# Patient Record
Sex: MALE | Race: WHITE | URBAN - METROPOLITAN AREA
[De-identification: names, ages, dates, MRNs, and addresses within clinical notes are randomized per-mention and may not be internally consistent; named-entity substitution may affect disease eponyms.]

---

## 2019-07-23 ENCOUNTER — ASC (OUTPATIENT)
Dept: URBAN - METROPOLITAN AREA SURGERY 8 | Facility: SURGERY | Age: 59
Setting detail: OPHTHALMOLOGY
End: 2019-07-23
Payer: COMMERCIAL

## 2019-07-23 DIAGNOSIS — E11.3312: ICD-10-CM

## 2019-07-23 PROCEDURE — 67210 TREATMENT OF RETINAL LESION: CPT | Performed by: OPHTHALMOLOGY

## 2019-07-23 ASSESSMENT — REFRACTION_CURRENTRX
OS_OVR_VA: 20/
OD_ADD: +2.00
OD_SPHERE: -0.75
OS_CYLINDER: 0.00
OS_OVR_VA: 20/
OD_OVR_VA: 20/
OS_SPHERE: -0.75
OD_CYLINDER: 0.00
OD_OVR_VA: 20/
OD_AXIS: 180
OS_ADD: +2.00
OD_VPRISM_DIRECTION: PROGS
OS_AXIS: 180
OS_VPRISM_DIRECTION: PROGS
OS_OVR_VA: 20/
OD_OVR_VA: 20/

## 2019-07-23 ASSESSMENT — REFRACTION_AUTOREFRACTION
OS_AXIS: 176
OD_SPHERE: -0.25
OS_CYLINDER: -0.25
OD_AXIS: 135
OD_CYLINDER: -0.50
OS_SPHERE: -0.75

## 2019-07-23 ASSESSMENT — REFRACTION_MANIFEST
OS_VA3: 20/
OD_VA3: 20/
OS_VA1: 20/
OS_VA2: 20/
OD_VA1: 20/
OS_VA2: 20/
OD_VA1: 20/
OD_VA2: 20/
OS_VA1: 20/
OU_VA: 20/
OD_VA3: 20/
OD_VA2: 20/
OS_VA3: 20/
OU_VA: 20/

## 2019-07-23 ASSESSMENT — VISUAL ACUITY
OS_BCVA: 20/20
OD_BCVA: 20/40

## 2019-07-23 ASSESSMENT — SPHEQUIV_DERIVED
OS_SPHEQUIV: -0.875
OD_SPHEQUIV: -0.5

## 2019-07-23 ASSESSMENT — CONFRONTATIONAL VISUAL FIELD TEST (CVF)
OS_FINDINGS: FULL
OD_FINDINGS: FULL

## 2019-08-05 ENCOUNTER — OFFICE (OUTPATIENT)
Dept: URBAN - METROPOLITAN AREA CLINIC 105 | Facility: CLINIC | Age: 59
Setting detail: OPHTHALMOLOGY
End: 2019-08-05
Payer: COMMERCIAL

## 2019-08-05 DIAGNOSIS — E11.3311: ICD-10-CM

## 2019-08-05 DIAGNOSIS — E11.3313: ICD-10-CM

## 2019-08-05 PROCEDURE — 67028 INJECTION EYE DRUG: CPT | Performed by: OPHTHALMOLOGY

## 2019-08-05 PROCEDURE — 92250 FUNDUS PHOTOGRAPHY W/I&R: CPT | Performed by: OPHTHALMOLOGY

## 2019-08-05 ASSESSMENT — REFRACTION_MANIFEST
OD_VA1: 20/
OD_VA3: 20/
OD_VA1: 20/
OS_VA2: 20/
OS_VA3: 20/
OU_VA: 20/
OS_VA3: 20/
OU_VA: 20/
OD_VA2: 20/
OS_VA2: 20/
OD_VA2: 20/
OS_VA1: 20/
OS_VA1: 20/
OD_VA3: 20/

## 2019-08-05 ASSESSMENT — REFRACTION_AUTOREFRACTION
OD_SPHERE: -0.25
OD_AXIS: 135
OD_CYLINDER: -0.50
OS_CYLINDER: -0.25
OS_SPHERE: -0.75
OS_AXIS: 176

## 2019-08-05 ASSESSMENT — REFRACTION_CURRENTRX
OS_CYLINDER: 0.00
OD_SPHERE: -0.75
OS_OVR_VA: 20/
OD_OVR_VA: 20/
OD_OVR_VA: 20/
OS_ADD: +2.00
OS_SPHERE: -0.75
OS_AXIS: 180
OD_AXIS: 180
OS_VPRISM_DIRECTION: PROGS
OD_CYLINDER: 0.00
OS_OVR_VA: 20/
OD_OVR_VA: 20/
OS_OVR_VA: 20/
OD_ADD: +2.00
OD_VPRISM_DIRECTION: PROGS

## 2019-08-05 ASSESSMENT — CONFRONTATIONAL VISUAL FIELD TEST (CVF)
OD_FINDINGS: FULL
OS_FINDINGS: FULL

## 2019-08-05 ASSESSMENT — SPHEQUIV_DERIVED
OD_SPHEQUIV: -0.5
OS_SPHEQUIV: -0.875

## 2019-08-05 ASSESSMENT — VISUAL ACUITY
OS_BCVA: 20/30
OD_BCVA: 20/80

## 2019-08-12 ENCOUNTER — OFFICE (OUTPATIENT)
Dept: URBAN - METROPOLITAN AREA CLINIC 105 | Facility: CLINIC | Age: 59
Setting detail: OPHTHALMOLOGY
End: 2019-08-12
Payer: COMMERCIAL

## 2019-08-12 DIAGNOSIS — E11.3312: ICD-10-CM

## 2019-08-12 DIAGNOSIS — E11.3313: ICD-10-CM

## 2019-08-12 PROBLEM — H11.32 SUBCONJUNCTIVAL HEMORRHAGE; LEFT EYE: Status: RESOLVED | Noted: 2019-05-22 | Resolved: 2019-08-12

## 2019-08-12 PROCEDURE — 92250 FUNDUS PHOTOGRAPHY W/I&R: CPT | Performed by: OPHTHALMOLOGY

## 2019-08-12 PROCEDURE — 67028 INJECTION EYE DRUG: CPT | Performed by: OPHTHALMOLOGY

## 2019-08-12 ASSESSMENT — REFRACTION_AUTOREFRACTION
OD_CYLINDER: -0.50
OS_SPHERE: -0.75
OS_AXIS: 176
OD_AXIS: 135
OD_SPHERE: -0.25
OS_CYLINDER: -0.25

## 2019-08-12 ASSESSMENT — REFRACTION_MANIFEST
OD_VA2: 20/
OS_VA2: 20/
OD_VA1: 20/
OS_VA3: 20/
OD_VA3: 20/
OS_VA1: 20/
OS_VA2: 20/
OD_VA3: 20/
OU_VA: 20/
OD_VA1: 20/
OD_VA2: 20/
OU_VA: 20/
OS_VA1: 20/
OS_VA3: 20/

## 2019-08-12 ASSESSMENT — REFRACTION_CURRENTRX
OD_OVR_VA: 20/
OS_OVR_VA: 20/
OS_OVR_VA: 20/
OS_ADD: +2.00
OS_AXIS: 180
OD_CYLINDER: 0.00
OS_OVR_VA: 20/
OD_ADD: +2.00
OD_SPHERE: -0.75
OD_OVR_VA: 20/
OS_SPHERE: -0.75
OS_VPRISM_DIRECTION: PROGS
OS_CYLINDER: 0.00
OD_VPRISM_DIRECTION: PROGS
OD_AXIS: 180
OD_OVR_VA: 20/

## 2019-08-12 ASSESSMENT — SPHEQUIV_DERIVED
OD_SPHEQUIV: -0.5
OS_SPHEQUIV: -0.875

## 2019-08-12 ASSESSMENT — VISUAL ACUITY
OD_BCVA: 20/70-
OS_BCVA: 20/30

## 2019-08-12 ASSESSMENT — CONFRONTATIONAL VISUAL FIELD TEST (CVF)
OS_FINDINGS: FULL
OD_FINDINGS: FULL

## 2019-10-28 ENCOUNTER — OFFICE (OUTPATIENT)
Dept: URBAN - METROPOLITAN AREA CLINIC 105 | Facility: CLINIC | Age: 59
Setting detail: OPHTHALMOLOGY
End: 2019-10-28
Payer: COMMERCIAL

## 2019-10-28 DIAGNOSIS — E11.3311: ICD-10-CM

## 2019-10-28 DIAGNOSIS — E11.3313: ICD-10-CM

## 2019-10-28 PROCEDURE — 92134 CPTRZ OPH DX IMG PST SGM RTA: CPT | Performed by: OPHTHALMOLOGY

## 2019-10-28 PROCEDURE — 67028 INJECTION EYE DRUG: CPT | Performed by: OPHTHALMOLOGY

## 2019-10-28 ASSESSMENT — REFRACTION_AUTOREFRACTION
OS_SPHERE: -0.75
OS_AXIS: 176
OS_CYLINDER: -0.25
OD_AXIS: 135
OD_SPHERE: -0.25
OD_CYLINDER: -0.50

## 2019-10-28 ASSESSMENT — KERATOMETRY
OS_K2POWER_DIOPTERS: 44.75
OD_K2POWER_DIOPTERS: 45.00
OD_K1POWER_DIOPTERS: 44.00
OD_AXISANGLE_DEGREES: 074
OS_K1POWER_DIOPTERS: 44.25
OS_AXISANGLE_DEGREES: 100

## 2019-10-28 ASSESSMENT — REFRACTION_MANIFEST
OD_VA3: 20/
OD_VA2: 20/
OS_VA2: 20/
OD_VA1: 20/
OU_VA: 20/
OS_VA1: 20/
OS_VA3: 20/

## 2019-10-28 ASSESSMENT — REFRACTION_CURRENTRX
OD_VPRISM_DIRECTION: PROGS
OS_OVR_VA: 20/
OS_ADD: +2.00
OD_SPHERE: -0.75
OS_VPRISM_DIRECTION: PROGS
OS_CYLINDER: 0.00
OD_OVR_VA: 20/
OD_AXIS: 180
OD_CYLINDER: 0.00
OS_SPHERE: -0.75
OD_ADD: +2.00
OS_AXIS: 180

## 2019-10-28 ASSESSMENT — CONFRONTATIONAL VISUAL FIELD TEST (CVF)
OD_FINDINGS: FULL
OS_FINDINGS: FULL

## 2019-10-28 ASSESSMENT — VISUAL ACUITY
OS_BCVA: 20/30-1
OD_BCVA: 20/30-1

## 2019-10-28 ASSESSMENT — AXIALLENGTH_DERIVED
OS_AL: 23.5655
OD_AL: 23.4207

## 2019-10-28 ASSESSMENT — SPHEQUIV_DERIVED
OD_SPHEQUIV: -0.5
OS_SPHEQUIV: -0.875

## 2019-11-18 ENCOUNTER — OFFICE (OUTPATIENT)
Dept: URBAN - METROPOLITAN AREA CLINIC 12 | Facility: CLINIC | Age: 59
Setting detail: OPHTHALMOLOGY
End: 2019-11-18
Payer: COMMERCIAL

## 2019-11-18 DIAGNOSIS — E11.3312: ICD-10-CM

## 2019-11-18 DIAGNOSIS — E11.3313: ICD-10-CM

## 2019-11-18 PROCEDURE — 67028 INJECTION EYE DRUG: CPT | Performed by: OPHTHALMOLOGY

## 2019-11-18 PROCEDURE — 92134 CPTRZ OPH DX IMG PST SGM RTA: CPT | Performed by: OPHTHALMOLOGY

## 2019-11-18 ASSESSMENT — REFRACTION_CURRENTRX
OD_SPHERE: -0.75
OD_VPRISM_DIRECTION: PROGS
OS_CYLINDER: 0.00
OS_VPRISM_DIRECTION: PROGS
OS_OVR_VA: 20/
OS_ADD: +2.00
OS_SPHERE: -0.75
OD_AXIS: 180
OS_AXIS: 180
OD_ADD: +2.00
OD_OVR_VA: 20/
OD_CYLINDER: 0.00

## 2019-11-18 ASSESSMENT — REFRACTION_AUTOREFRACTION
OS_AXIS: 177
OD_AXIS: 140
OS_CYLINDER: -0.25
OS_SPHERE: -0.50
OD_SPHERE: PLANO
OD_CYLINDER: -0.50

## 2019-11-18 ASSESSMENT — REFRACTION_MANIFEST
OS_VA3: 20/
OU_VA: 20/
OS_VA2: 20/
OD_VA3: 20/
OD_VA2: 20/
OD_VA1: 20/
OS_VA1: 20/

## 2019-11-18 ASSESSMENT — VISUAL ACUITY
OD_BCVA: 20/40
OS_BCVA: 20/20-1

## 2019-11-18 ASSESSMENT — CONFRONTATIONAL VISUAL FIELD TEST (CVF)
OD_FINDINGS: FULL
OS_FINDINGS: FULL

## 2019-11-18 ASSESSMENT — KERATOMETRY
OD_K1POWER_DIOPTERS: 44.00
OS_AXISANGLE_DEGREES: 086
OD_AXISANGLE_DEGREES: 061
OD_K2POWER_DIOPTERS: 44.75
OS_K2POWER_DIOPTERS: 45.00
OS_K1POWER_DIOPTERS: 44.75

## 2019-11-18 ASSESSMENT — AXIALLENGTH_DERIVED: OS_AL: 23.3332

## 2019-11-18 ASSESSMENT — SPHEQUIV_DERIVED: OS_SPHEQUIV: -0.625

## 2019-12-03 ENCOUNTER — OFFICE (OUTPATIENT)
Dept: URBAN - METROPOLITAN AREA CLINIC 94 | Facility: CLINIC | Age: 59
Setting detail: OPHTHALMOLOGY
End: 2019-12-03
Payer: COMMERCIAL

## 2019-12-03 DIAGNOSIS — E11.3313: ICD-10-CM

## 2019-12-03 DIAGNOSIS — E11.3311: ICD-10-CM

## 2019-12-03 PROCEDURE — 92250 FUNDUS PHOTOGRAPHY W/I&R: CPT | Performed by: OPHTHALMOLOGY

## 2019-12-03 PROCEDURE — 67028 INJECTION EYE DRUG: CPT | Performed by: OPHTHALMOLOGY

## 2019-12-03 ASSESSMENT — CONFRONTATIONAL VISUAL FIELD TEST (CVF)
OD_FINDINGS: FULL
OS_FINDINGS: FULL

## 2019-12-03 ASSESSMENT — REFRACTION_MANIFEST
OS_VA3: 20/
OD_VA3: 20/
OD_VA2: 20/
OU_VA: 20/
OS_VA2: 20/
OS_VA1: 20/
OD_VA1: 20/

## 2019-12-03 ASSESSMENT — KERATOMETRY
OS_K2POWER_DIOPTERS: 45.00
OD_K2POWER_DIOPTERS: 44.75
OD_AXISANGLE_DEGREES: 061
OS_K1POWER_DIOPTERS: 44.75
OD_K1POWER_DIOPTERS: 44.00
OS_AXISANGLE_DEGREES: 086

## 2019-12-03 ASSESSMENT — SPHEQUIV_DERIVED: OS_SPHEQUIV: -0.625

## 2019-12-03 ASSESSMENT — VISUAL ACUITY
OS_BCVA: 20/20
OD_BCVA: 20/40

## 2019-12-03 ASSESSMENT — REFRACTION_AUTOREFRACTION
OS_AXIS: 177
OS_CYLINDER: -0.25
OD_AXIS: 140
OS_SPHERE: -0.50
OD_CYLINDER: -0.50
OD_SPHERE: PLANO

## 2019-12-03 ASSESSMENT — REFRACTION_CURRENTRX
OD_VPRISM_DIRECTION: PROGS
OS_OVR_VA: 20/
OS_CYLINDER: 0.00
OS_SPHERE: -0.75
OD_AXIS: 180
OD_OVR_VA: 20/
OS_VPRISM_DIRECTION: PROGS
OD_SPHERE: -0.75
OS_ADD: +2.00
OD_ADD: +2.00
OS_AXIS: 180
OD_CYLINDER: 0.00

## 2019-12-03 ASSESSMENT — AXIALLENGTH_DERIVED: OS_AL: 23.3332

## 2019-12-09 ENCOUNTER — OFFICE (OUTPATIENT)
Dept: URBAN - METROPOLITAN AREA CLINIC 105 | Facility: CLINIC | Age: 59
Setting detail: OPHTHALMOLOGY
End: 2019-12-09
Payer: COMMERCIAL

## 2019-12-09 DIAGNOSIS — E11.3311: ICD-10-CM

## 2019-12-09 PROCEDURE — 67210 TREATMENT OF RETINAL LESION: CPT | Performed by: OPHTHALMOLOGY

## 2019-12-09 ASSESSMENT — KERATOMETRY
OD_K2POWER_DIOPTERS: 44.75
OS_K2POWER_DIOPTERS: 45.00
OS_AXISANGLE_DEGREES: 086
OD_K1POWER_DIOPTERS: 44.00
OD_AXISANGLE_DEGREES: 061
OS_K1POWER_DIOPTERS: 44.75

## 2019-12-09 ASSESSMENT — REFRACTION_CURRENTRX
OS_OVR_VA: 20/
OS_SPHERE: -0.75
OD_VPRISM_DIRECTION: PROGS
OS_AXIS: 180
OD_ADD: +2.00
OD_CYLINDER: 0.00
OD_AXIS: 180
OS_CYLINDER: 0.00
OD_SPHERE: -0.75
OS_ADD: +2.00
OS_VPRISM_DIRECTION: PROGS
OD_OVR_VA: 20/

## 2019-12-09 ASSESSMENT — AXIALLENGTH_DERIVED: OS_AL: 23.3332

## 2019-12-09 ASSESSMENT — CONFRONTATIONAL VISUAL FIELD TEST (CVF)
OD_FINDINGS: FULL
OS_FINDINGS: FULL

## 2019-12-09 ASSESSMENT — REFRACTION_MANIFEST
OD_VA1: 20/
OD_VA3: 20/
OD_VA2: 20/
OS_VA2: 20/
OU_VA: 20/
OS_VA1: 20/
OS_VA3: 20/

## 2019-12-09 ASSESSMENT — REFRACTION_AUTOREFRACTION
OD_CYLINDER: -0.50
OS_AXIS: 177
OS_CYLINDER: -0.25
OD_SPHERE: PLANO
OS_SPHERE: -0.50
OD_AXIS: 140

## 2019-12-09 ASSESSMENT — SPHEQUIV_DERIVED: OS_SPHEQUIV: -0.625

## 2019-12-09 ASSESSMENT — VISUAL ACUITY
OS_BCVA: 20/20
OD_BCVA: 20/40

## 2019-12-23 ENCOUNTER — OFFICE (OUTPATIENT)
Dept: URBAN - METROPOLITAN AREA CLINIC 105 | Facility: CLINIC | Age: 59
Setting detail: OPHTHALMOLOGY
End: 2019-12-23
Payer: COMMERCIAL

## 2019-12-23 DIAGNOSIS — E11.3312: ICD-10-CM

## 2019-12-23 PROCEDURE — 67210 TREATMENT OF RETINAL LESION: CPT | Performed by: OPHTHALMOLOGY

## 2019-12-23 ASSESSMENT — REFRACTION_MANIFEST
OD_VA1: 20/
OD_VA3: 20/
OU_VA: 20/
OD_VA2: 20/
OS_VA2: 20/
OS_VA1: 20/
OS_VA3: 20/

## 2019-12-23 ASSESSMENT — KERATOMETRY
OD_K2POWER_DIOPTERS: 44.75
OS_K1POWER_DIOPTERS: 44.75
OD_K1POWER_DIOPTERS: 44.00
OS_AXISANGLE_DEGREES: 086
OD_AXISANGLE_DEGREES: 061
OS_K2POWER_DIOPTERS: 45.00

## 2019-12-23 ASSESSMENT — REFRACTION_CURRENTRX
OS_AXIS: 180
OS_ADD: +2.00
OS_SPHERE: -0.75
OS_CYLINDER: 0.00
OD_ADD: +2.00
OD_AXIS: 180
OS_OVR_VA: 20/
OD_OVR_VA: 20/
OD_SPHERE: -0.75
OD_VPRISM_DIRECTION: PROGS
OS_VPRISM_DIRECTION: PROGS
OD_CYLINDER: 0.00

## 2019-12-23 ASSESSMENT — VISUAL ACUITY
OD_BCVA: 20/30
OS_BCVA: 20/20

## 2019-12-23 ASSESSMENT — CONFRONTATIONAL VISUAL FIELD TEST (CVF)
OD_FINDINGS: FULL
OS_FINDINGS: FULL

## 2019-12-23 ASSESSMENT — REFRACTION_AUTOREFRACTION
OD_AXIS: 140
OS_AXIS: 177
OD_CYLINDER: -0.50
OS_CYLINDER: -0.25
OS_SPHERE: -0.50
OD_SPHERE: PLANO

## 2019-12-23 ASSESSMENT — SPHEQUIV_DERIVED: OS_SPHEQUIV: -0.625

## 2019-12-23 ASSESSMENT — AXIALLENGTH_DERIVED: OS_AL: 23.3332

## 2020-01-13 ENCOUNTER — OFFICE (OUTPATIENT)
Dept: URBAN - METROPOLITAN AREA CLINIC 105 | Facility: CLINIC | Age: 60
Setting detail: OPHTHALMOLOGY
End: 2020-01-13
Payer: COMMERCIAL

## 2020-01-13 DIAGNOSIS — E11.3313: ICD-10-CM

## 2020-01-13 DIAGNOSIS — E11.3312: ICD-10-CM

## 2020-01-13 PROCEDURE — 92134 CPTRZ OPH DX IMG PST SGM RTA: CPT | Performed by: OPHTHALMOLOGY

## 2020-01-13 PROCEDURE — 67028 INJECTION EYE DRUG: CPT | Performed by: OPHTHALMOLOGY

## 2020-01-13 ASSESSMENT — VISUAL ACUITY
OS_BCVA: 20/30+2
OD_BCVA: 20/40+

## 2020-01-13 ASSESSMENT — REFRACTION_CURRENTRX
OS_VPRISM_DIRECTION: PROGS
OS_CYLINDER: 0.00
OD_CYLINDER: 0.00
OD_VPRISM_DIRECTION: PROGS
OS_AXIS: 180
OD_OVR_VA: 20/
OD_AXIS: 180
OS_OVR_VA: 20/
OS_SPHERE: -0.75
OD_ADD: +2.00
OS_ADD: +2.00
OD_SPHERE: -0.75

## 2020-01-13 ASSESSMENT — REFRACTION_MANIFEST
OU_VA: 20/
OS_VA1: 20/
OS_VA2: 20/
OD_VA2: 20/
OS_VA3: 20/
OD_VA3: 20/
OD_VA1: 20/

## 2020-01-13 ASSESSMENT — REFRACTION_AUTOREFRACTION
OS_AXIS: 177
OD_CYLINDER: -0.50
OS_CYLINDER: -0.25
OD_SPHERE: PLANO
OS_SPHERE: -0.50
OD_AXIS: 140

## 2020-01-13 ASSESSMENT — KERATOMETRY
OD_K2POWER_DIOPTERS: 44.75
OS_K1POWER_DIOPTERS: 44.75
OS_K2POWER_DIOPTERS: 45.00
OD_AXISANGLE_DEGREES: 061
OD_K1POWER_DIOPTERS: 44.00
OS_AXISANGLE_DEGREES: 086

## 2020-01-13 ASSESSMENT — AXIALLENGTH_DERIVED: OS_AL: 23.3332

## 2020-01-13 ASSESSMENT — CONFRONTATIONAL VISUAL FIELD TEST (CVF)
OS_FINDINGS: FULL
OD_FINDINGS: FULL

## 2020-01-13 ASSESSMENT — SPHEQUIV_DERIVED: OS_SPHEQUIV: -0.625

## 2020-01-27 ENCOUNTER — OFFICE (OUTPATIENT)
Dept: URBAN - METROPOLITAN AREA CLINIC 105 | Facility: CLINIC | Age: 60
Setting detail: OPHTHALMOLOGY
End: 2020-01-27
Payer: COMMERCIAL

## 2020-01-27 DIAGNOSIS — E11.3311: ICD-10-CM

## 2020-01-27 DIAGNOSIS — E11.3313: ICD-10-CM

## 2020-01-27 PROCEDURE — 92250 FUNDUS PHOTOGRAPHY W/I&R: CPT | Performed by: OPHTHALMOLOGY

## 2020-01-27 PROCEDURE — 67028 INJECTION EYE DRUG: CPT | Performed by: OPHTHALMOLOGY

## 2020-01-27 ASSESSMENT — AXIALLENGTH_DERIVED: OS_AL: 23.3332

## 2020-01-27 ASSESSMENT — CONFRONTATIONAL VISUAL FIELD TEST (CVF)
OD_FINDINGS: FULL
OS_FINDINGS: FULL

## 2020-01-27 ASSESSMENT — REFRACTION_CURRENTRX
OD_VPRISM_DIRECTION: PROGS
OD_OVR_VA: 20/
OS_VPRISM_DIRECTION: PROGS
OS_SPHERE: -0.75
OS_AXIS: 180
OD_SPHERE: -0.75
OD_ADD: +2.00
OS_OVR_VA: 20/
OD_CYLINDER: 0.00
OD_AXIS: 180
OS_CYLINDER: 0.00
OS_ADD: +2.00

## 2020-01-27 ASSESSMENT — REFRACTION_MANIFEST
OS_VA3: 20/
OD_VA1: 20/
OD_VA3: 20/
OS_VA2: 20/
OD_VA2: 20/
OS_VA1: 20/
OU_VA: 20/

## 2020-01-27 ASSESSMENT — KERATOMETRY
OS_K1POWER_DIOPTERS: 44.75
OD_K2POWER_DIOPTERS: 44.75
OS_K2POWER_DIOPTERS: 45.00
OS_AXISANGLE_DEGREES: 086
OD_AXISANGLE_DEGREES: 061
OD_K1POWER_DIOPTERS: 44.00

## 2020-01-27 ASSESSMENT — REFRACTION_AUTOREFRACTION
OS_CYLINDER: -0.25
OS_SPHERE: -0.50
OD_AXIS: 140
OD_CYLINDER: -0.50
OD_SPHERE: PLANO
OS_AXIS: 177

## 2020-01-27 ASSESSMENT — SPHEQUIV_DERIVED: OS_SPHEQUIV: -0.625

## 2020-01-27 ASSESSMENT — VISUAL ACUITY
OD_BCVA: 20/40
OS_BCVA: 20/20-

## 2020-02-24 ENCOUNTER — OFFICE (OUTPATIENT)
Dept: URBAN - METROPOLITAN AREA CLINIC 105 | Facility: CLINIC | Age: 60
Setting detail: OPHTHALMOLOGY
End: 2020-02-24
Payer: COMMERCIAL

## 2020-02-24 DIAGNOSIS — E11.3313: ICD-10-CM

## 2020-02-24 DIAGNOSIS — E11.3312: ICD-10-CM

## 2020-02-24 PROCEDURE — 92134 CPTRZ OPH DX IMG PST SGM RTA: CPT | Performed by: OPHTHALMOLOGY

## 2020-02-24 PROCEDURE — 67028 INJECTION EYE DRUG: CPT | Performed by: OPHTHALMOLOGY

## 2020-02-24 ASSESSMENT — REFRACTION_CURRENTRX
OD_AXIS: 180
OD_VPRISM_DIRECTION: PROGS
OD_SPHERE: -0.75
OS_OVR_VA: 20/
OS_CYLINDER: 0.00
OD_CYLINDER: 0.00
OS_VPRISM_DIRECTION: PROGS
OS_SPHERE: -0.75
OS_ADD: +2.00
OS_AXIS: 180
OD_OVR_VA: 20/
OD_ADD: +2.00

## 2020-02-24 ASSESSMENT — CONFRONTATIONAL VISUAL FIELD TEST (CVF)
OD_FINDINGS: FULL
OS_FINDINGS: FULL

## 2020-02-24 ASSESSMENT — REFRACTION_AUTOREFRACTION
OS_SPHERE: -0.50
OD_CYLINDER: -0.50
OD_AXIS: 140
OS_CYLINDER: -0.25
OS_AXIS: 177
OD_SPHERE: PLANO

## 2020-02-24 ASSESSMENT — KERATOMETRY
OD_K1POWER_DIOPTERS: 44.00
OS_K1POWER_DIOPTERS: 44.75
OS_K2POWER_DIOPTERS: 45.00
OS_AXISANGLE_DEGREES: 086
OD_AXISANGLE_DEGREES: 061
OD_K2POWER_DIOPTERS: 44.75

## 2020-02-24 ASSESSMENT — VISUAL ACUITY
OS_BCVA: 20/25-
OD_BCVA: 20/40

## 2020-02-24 ASSESSMENT — SPHEQUIV_DERIVED: OS_SPHEQUIV: -0.625

## 2020-02-24 ASSESSMENT — AXIALLENGTH_DERIVED: OS_AL: 23.3332

## 2020-03-09 ENCOUNTER — OFFICE (OUTPATIENT)
Dept: URBAN - METROPOLITAN AREA CLINIC 105 | Facility: CLINIC | Age: 60
Setting detail: OPHTHALMOLOGY
End: 2020-03-09
Payer: COMMERCIAL

## 2020-03-09 DIAGNOSIS — E11.3313: ICD-10-CM

## 2020-03-09 DIAGNOSIS — E11.3311: ICD-10-CM

## 2020-03-09 PROCEDURE — 92250 FUNDUS PHOTOGRAPHY W/I&R: CPT | Performed by: OPHTHALMOLOGY

## 2020-03-09 PROCEDURE — 67028 INJECTION EYE DRUG: CPT | Performed by: OPHTHALMOLOGY

## 2020-03-09 ASSESSMENT — REFRACTION_CURRENTRX
OS_AXIS: 180
OD_AXIS: 180
OS_OVR_VA: 20/
OD_OVR_VA: 20/
OD_CYLINDER: 0.00
OS_ADD: +2.00
OS_VPRISM_DIRECTION: PROGS
OS_CYLINDER: 0.00
OD_SPHERE: -0.75
OD_VPRISM_DIRECTION: PROGS
OD_ADD: +2.00
OS_SPHERE: -0.75

## 2020-03-09 ASSESSMENT — KERATOMETRY
OS_K1POWER_DIOPTERS: 44.75
OS_K2POWER_DIOPTERS: 45.00
OD_K2POWER_DIOPTERS: 44.75
OD_K1POWER_DIOPTERS: 44.00
OS_AXISANGLE_DEGREES: 086
OD_AXISANGLE_DEGREES: 061

## 2020-03-09 ASSESSMENT — CONFRONTATIONAL VISUAL FIELD TEST (CVF)
OS_FINDINGS: FULL
OD_FINDINGS: FULL

## 2020-03-09 ASSESSMENT — REFRACTION_AUTOREFRACTION
OD_CYLINDER: -0.50
OS_SPHERE: -0.50
OD_SPHERE: PLANO
OS_CYLINDER: -0.25
OS_AXIS: 177
OD_AXIS: 140

## 2020-03-09 ASSESSMENT — VISUAL ACUITY
OS_BCVA: 20/25-1
OD_BCVA: 20/30

## 2020-03-09 ASSESSMENT — SPHEQUIV_DERIVED: OS_SPHEQUIV: -0.625

## 2020-03-09 ASSESSMENT — AXIALLENGTH_DERIVED: OS_AL: 23.3332

## 2020-04-27 ENCOUNTER — OFFICE (OUTPATIENT)
Dept: URBAN - METROPOLITAN AREA CLINIC 105 | Facility: CLINIC | Age: 60
Setting detail: OPHTHALMOLOGY
End: 2020-04-27
Payer: COMMERCIAL

## 2020-04-27 DIAGNOSIS — E11.3311: ICD-10-CM

## 2020-04-27 PROCEDURE — 67210 TREATMENT OF RETINAL LESION: CPT | Performed by: OPHTHALMOLOGY

## 2020-04-27 ASSESSMENT — VISUAL ACUITY
OS_BCVA: 20/20-1
OD_BCVA: 20/30

## 2020-04-27 ASSESSMENT — KERATOMETRY
OD_K2POWER_DIOPTERS: 44.75
OS_AXISANGLE_DEGREES: 086
OS_K1POWER_DIOPTERS: 44.75
OS_K2POWER_DIOPTERS: 45.00
OD_AXISANGLE_DEGREES: 061
OD_K1POWER_DIOPTERS: 44.00

## 2020-04-27 ASSESSMENT — REFRACTION_CURRENTRX
OD_CYLINDER: 0.00
OD_VPRISM_DIRECTION: PROGS
OS_ADD: +2.00
OS_SPHERE: -0.75
OD_ADD: +2.00
OD_OVR_VA: 20/
OS_CYLINDER: 0.00
OS_AXIS: 180
OD_AXIS: 180
OD_SPHERE: -0.75
OS_VPRISM_DIRECTION: PROGS
OS_OVR_VA: 20/

## 2020-04-27 ASSESSMENT — REFRACTION_AUTOREFRACTION
OS_AXIS: 177
OD_AXIS: 140
OS_SPHERE: -0.50
OS_CYLINDER: -0.25
OD_CYLINDER: -0.50
OD_SPHERE: PLANO

## 2020-04-27 ASSESSMENT — SPHEQUIV_DERIVED: OS_SPHEQUIV: -0.625

## 2020-04-27 ASSESSMENT — AXIALLENGTH_DERIVED: OS_AL: 23.3332

## 2020-04-27 ASSESSMENT — CONFRONTATIONAL VISUAL FIELD TEST (CVF)
OS_FINDINGS: FULL
OD_FINDINGS: FULL

## 2020-05-04 ENCOUNTER — OFFICE (OUTPATIENT)
Dept: URBAN - METROPOLITAN AREA CLINIC 105 | Facility: CLINIC | Age: 60
Setting detail: OPHTHALMOLOGY
End: 2020-05-04
Payer: COMMERCIAL

## 2020-05-04 DIAGNOSIS — E11.3312: ICD-10-CM

## 2020-05-04 PROCEDURE — 67210 TREATMENT OF RETINAL LESION: CPT | Performed by: OPHTHALMOLOGY

## 2020-05-04 ASSESSMENT — REFRACTION_AUTOREFRACTION
OD_SPHERE: PLANO
OD_CYLINDER: -0.50
OS_SPHERE: -0.50
OS_AXIS: 177
OS_CYLINDER: -0.25
OD_AXIS: 140

## 2020-05-04 ASSESSMENT — KERATOMETRY
OS_AXISANGLE_DEGREES: 086
OD_K1POWER_DIOPTERS: 44.00
OD_K2POWER_DIOPTERS: 44.75
OS_K1POWER_DIOPTERS: 44.75
OD_AXISANGLE_DEGREES: 061
OS_K2POWER_DIOPTERS: 45.00

## 2020-05-04 ASSESSMENT — REFRACTION_CURRENTRX
OS_VPRISM_DIRECTION: PROGS
OD_OVR_VA: 20/
OS_AXIS: 180
OD_AXIS: 180
OS_OVR_VA: 20/
OD_CYLINDER: 0.00
OS_SPHERE: -0.75
OS_ADD: +2.00
OS_CYLINDER: 0.00
OD_ADD: +2.00
OD_SPHERE: -0.75
OD_VPRISM_DIRECTION: PROGS

## 2020-05-04 ASSESSMENT — SPHEQUIV_DERIVED: OS_SPHEQUIV: -0.625

## 2020-05-04 ASSESSMENT — VISUAL ACUITY
OS_BCVA: 20/20-1
OD_BCVA: 20/30

## 2020-05-04 ASSESSMENT — CONFRONTATIONAL VISUAL FIELD TEST (CVF)
OD_FINDINGS: FULL
OS_FINDINGS: FULL

## 2020-05-04 ASSESSMENT — AXIALLENGTH_DERIVED: OS_AL: 23.3332

## 2020-06-26 ENCOUNTER — OFFICE (OUTPATIENT)
Dept: URBAN - METROPOLITAN AREA CLINIC 105 | Facility: CLINIC | Age: 60
Setting detail: OPHTHALMOLOGY
End: 2020-06-26
Payer: COMMERCIAL

## 2020-06-26 DIAGNOSIS — E11.3311: ICD-10-CM

## 2020-06-26 DIAGNOSIS — E11.3313: ICD-10-CM

## 2020-06-26 PROCEDURE — 92134 CPTRZ OPH DX IMG PST SGM RTA: CPT | Performed by: OPHTHALMOLOGY

## 2020-06-26 PROCEDURE — 67028 INJECTION EYE DRUG: CPT | Performed by: OPHTHALMOLOGY

## 2020-06-26 ASSESSMENT — REFRACTION_CURRENTRX
OS_OVR_VA: 20/
OS_VPRISM_DIRECTION: PROGS
OD_AXIS: 180
OS_ADD: +2.00
OS_SPHERE: -0.75
OD_CYLINDER: 0.00
OS_AXIS: 180
OD_SPHERE: -0.75
OD_ADD: +2.00
OD_OVR_VA: 20/
OD_VPRISM_DIRECTION: PROGS
OS_CYLINDER: 0.00

## 2020-06-26 ASSESSMENT — CONFRONTATIONAL VISUAL FIELD TEST (CVF)
OD_FINDINGS: FULL
OS_FINDINGS: FULL

## 2020-06-26 ASSESSMENT — KERATOMETRY
OS_K1POWER_DIOPTERS: 44.75
OS_AXISANGLE_DEGREES: 086
OD_K1POWER_DIOPTERS: 44.00
OS_K2POWER_DIOPTERS: 45.00
OD_AXISANGLE_DEGREES: 061
OD_K2POWER_DIOPTERS: 44.75

## 2020-06-26 ASSESSMENT — AXIALLENGTH_DERIVED: OS_AL: 23.3332

## 2020-06-26 ASSESSMENT — REFRACTION_AUTOREFRACTION
OD_CYLINDER: -0.50
OS_AXIS: 177
OD_SPHERE: PLANO
OS_SPHERE: -0.50
OD_AXIS: 140
OS_CYLINDER: -0.25

## 2020-06-26 ASSESSMENT — VISUAL ACUITY
OD_BCVA: 20/40-1
OS_BCVA: 20/25-1

## 2020-06-26 ASSESSMENT — SPHEQUIV_DERIVED: OS_SPHEQUIV: -0.625

## 2020-07-13 ENCOUNTER — OFFICE (OUTPATIENT)
Dept: URBAN - METROPOLITAN AREA CLINIC 105 | Facility: CLINIC | Age: 60
Setting detail: OPHTHALMOLOGY
End: 2020-07-13
Payer: COMMERCIAL

## 2020-07-13 DIAGNOSIS — E11.3312: ICD-10-CM

## 2020-07-13 DIAGNOSIS — E11.3313: ICD-10-CM

## 2020-07-13 PROCEDURE — 92250 FUNDUS PHOTOGRAPHY W/I&R: CPT | Performed by: OPHTHALMOLOGY

## 2020-07-13 PROCEDURE — 67028 INJECTION EYE DRUG: CPT | Performed by: OPHTHALMOLOGY

## 2020-07-13 ASSESSMENT — CONFRONTATIONAL VISUAL FIELD TEST (CVF)
OS_FINDINGS: FULL
OD_FINDINGS: FULL

## 2020-07-13 ASSESSMENT — REFRACTION_CURRENTRX
OD_AXIS: 180
OS_CYLINDER: 0.00
OD_CYLINDER: 0.00
OS_VPRISM_DIRECTION: PROGS
OS_SPHERE: -0.75
OS_AXIS: 180
OD_ADD: +2.00
OS_ADD: +2.00
OD_SPHERE: -0.75
OD_OVR_VA: 20/
OS_OVR_VA: 20/
OD_VPRISM_DIRECTION: PROGS

## 2020-07-13 ASSESSMENT — VISUAL ACUITY
OS_BCVA: 20/25-1
OD_BCVA: 20/40-1

## 2020-07-13 ASSESSMENT — SPHEQUIV_DERIVED: OS_SPHEQUIV: -0.625

## 2020-07-13 ASSESSMENT — REFRACTION_AUTOREFRACTION
OS_SPHERE: -0.50
OD_CYLINDER: -0.50
OD_AXIS: 140
OD_SPHERE: PLANO
OS_CYLINDER: -0.25
OS_AXIS: 177

## 2020-07-13 ASSESSMENT — KERATOMETRY
OS_K2POWER_DIOPTERS: 45.00
OD_AXISANGLE_DEGREES: 061
OS_AXISANGLE_DEGREES: 086
OS_K1POWER_DIOPTERS: 44.75
OD_K2POWER_DIOPTERS: 44.75
OD_K1POWER_DIOPTERS: 44.00

## 2020-07-13 ASSESSMENT — AXIALLENGTH_DERIVED: OS_AL: 23.3332

## 2020-07-27 ENCOUNTER — OFFICE (OUTPATIENT)
Dept: URBAN - METROPOLITAN AREA CLINIC 105 | Facility: CLINIC | Age: 60
Setting detail: OPHTHALMOLOGY
End: 2020-07-27
Payer: COMMERCIAL

## 2020-07-27 DIAGNOSIS — E11.3311: ICD-10-CM

## 2020-07-27 PROCEDURE — 67210 TREATMENT OF RETINAL LESION: CPT | Performed by: OPHTHALMOLOGY

## 2020-07-27 ASSESSMENT — CONFRONTATIONAL VISUAL FIELD TEST (CVF)
OD_FINDINGS: FULL
OS_FINDINGS: FULL

## 2020-07-27 ASSESSMENT — REFRACTION_CURRENTRX
OS_SPHERE: -0.75
OD_OVR_VA: 20/
OD_VPRISM_DIRECTION: PROGS
OD_ADD: +2.00
OS_ADD: +2.00
OD_CYLINDER: 0.00
OS_VPRISM_DIRECTION: PROGS
OD_SPHERE: -0.75
OS_OVR_VA: 20/
OS_AXIS: 180
OD_AXIS: 180
OS_CYLINDER: 0.00

## 2020-07-27 ASSESSMENT — KERATOMETRY
OS_K2POWER_DIOPTERS: 45.00
OD_K1POWER_DIOPTERS: 44.00
OD_K2POWER_DIOPTERS: 44.75
OS_AXISANGLE_DEGREES: 086
OD_AXISANGLE_DEGREES: 061
OS_K1POWER_DIOPTERS: 44.75

## 2020-07-27 ASSESSMENT — VISUAL ACUITY
OS_BCVA: 20/20
OD_BCVA: 20/25-2

## 2020-07-27 ASSESSMENT — REFRACTION_AUTOREFRACTION
OS_SPHERE: -0.50
OD_AXIS: 140
OD_SPHERE: PLANO
OS_CYLINDER: -0.25
OS_AXIS: 177
OD_CYLINDER: -0.50

## 2020-07-27 ASSESSMENT — TONOMETRY
OS_IOP_MMHG: 16
OD_IOP_MMHG: 18

## 2020-07-27 ASSESSMENT — SPHEQUIV_DERIVED: OS_SPHEQUIV: -0.625

## 2020-07-27 ASSESSMENT — AXIALLENGTH_DERIVED: OS_AL: 23.3332

## 2020-08-10 ENCOUNTER — OFFICE (OUTPATIENT)
Dept: URBAN - METROPOLITAN AREA CLINIC 105 | Facility: CLINIC | Age: 60
Setting detail: OPHTHALMOLOGY
End: 2020-08-10
Payer: COMMERCIAL

## 2020-08-10 DIAGNOSIS — E11.3312: ICD-10-CM

## 2020-08-10 PROCEDURE — 67210 TREATMENT OF RETINAL LESION: CPT | Performed by: OPHTHALMOLOGY

## 2020-08-10 ASSESSMENT — CONFRONTATIONAL VISUAL FIELD TEST (CVF)
OD_FINDINGS: FULL
OS_FINDINGS: FULL

## 2020-08-10 ASSESSMENT — REFRACTION_AUTOREFRACTION
OS_CYLINDER: -0.25
OS_SPHERE: -0.50
OS_AXIS: 177
OD_CYLINDER: -0.50
OD_SPHERE: PLANO
OD_AXIS: 140

## 2020-08-10 ASSESSMENT — REFRACTION_CURRENTRX
OS_ADD: +2.00
OD_OVR_VA: 20/
OS_SPHERE: -0.75
OD_CYLINDER: 0.00
OS_CYLINDER: 0.00
OS_OVR_VA: 20/
OD_ADD: +2.00
OD_SPHERE: -0.75
OS_VPRISM_DIRECTION: PROGS
OD_VPRISM_DIRECTION: PROGS
OS_AXIS: 180
OD_AXIS: 180

## 2020-08-10 ASSESSMENT — KERATOMETRY
OS_K2POWER_DIOPTERS: 45.00
OD_K2POWER_DIOPTERS: 44.75
OD_K1POWER_DIOPTERS: 44.00
OS_K1POWER_DIOPTERS: 44.75
OD_AXISANGLE_DEGREES: 061
OS_AXISANGLE_DEGREES: 086

## 2020-08-10 ASSESSMENT — VISUAL ACUITY
OS_BCVA: 20/25
OD_BCVA: 20/30+

## 2020-08-10 ASSESSMENT — AXIALLENGTH_DERIVED: OS_AL: 23.3332

## 2020-08-10 ASSESSMENT — SPHEQUIV_DERIVED: OS_SPHEQUIV: -0.625

## 2020-08-24 ENCOUNTER — OFFICE (OUTPATIENT)
Dept: URBAN - METROPOLITAN AREA CLINIC 105 | Facility: CLINIC | Age: 60
Setting detail: OPHTHALMOLOGY
End: 2020-08-24
Payer: COMMERCIAL

## 2020-08-24 DIAGNOSIS — E11.3311: ICD-10-CM

## 2020-08-24 DIAGNOSIS — E11.3313: ICD-10-CM

## 2020-08-24 PROCEDURE — 92250 FUNDUS PHOTOGRAPHY W/I&R: CPT | Performed by: OPHTHALMOLOGY

## 2020-08-24 PROCEDURE — 67028 INJECTION EYE DRUG: CPT | Performed by: OPHTHALMOLOGY

## 2020-08-24 ASSESSMENT — REFRACTION_AUTOREFRACTION
OD_AXIS: 140
OS_AXIS: 177
OD_SPHERE: PLANO
OS_SPHERE: -0.50
OS_CYLINDER: -0.25
OD_CYLINDER: -0.50

## 2020-08-24 ASSESSMENT — REFRACTION_CURRENTRX
OS_VPRISM_DIRECTION: PROGS
OS_AXIS: 180
OS_CYLINDER: 0.00
OS_OVR_VA: 20/
OS_ADD: +2.00
OD_OVR_VA: 20/
OS_SPHERE: -0.75
OD_ADD: +2.00
OD_CYLINDER: 0.00
OD_AXIS: 180
OD_VPRISM_DIRECTION: PROGS
OD_SPHERE: -0.75

## 2020-08-24 ASSESSMENT — KERATOMETRY
OS_AXISANGLE_DEGREES: 086
OS_K1POWER_DIOPTERS: 44.75
OS_K2POWER_DIOPTERS: 45.00
OD_AXISANGLE_DEGREES: 061
OD_K2POWER_DIOPTERS: 44.75
OD_K1POWER_DIOPTERS: 44.00

## 2020-08-24 ASSESSMENT — AXIALLENGTH_DERIVED: OS_AL: 23.3332

## 2020-08-24 ASSESSMENT — VISUAL ACUITY
OD_BCVA: 20/30+2
OS_BCVA: 20/20-1

## 2020-08-24 ASSESSMENT — CONFRONTATIONAL VISUAL FIELD TEST (CVF)
OS_FINDINGS: FULL
OD_FINDINGS: FULL

## 2020-08-24 ASSESSMENT — SPHEQUIV_DERIVED: OS_SPHEQUIV: -0.625

## 2020-09-14 ENCOUNTER — OFFICE (OUTPATIENT)
Dept: URBAN - METROPOLITAN AREA CLINIC 105 | Facility: CLINIC | Age: 60
Setting detail: OPHTHALMOLOGY
End: 2020-09-14
Payer: COMMERCIAL

## 2020-09-14 DIAGNOSIS — E11.3313: ICD-10-CM

## 2020-09-14 DIAGNOSIS — E11.3312: ICD-10-CM

## 2020-09-14 PROCEDURE — 92134 CPTRZ OPH DX IMG PST SGM RTA: CPT | Performed by: OPHTHALMOLOGY

## 2020-09-14 PROCEDURE — 67028 INJECTION EYE DRUG: CPT | Performed by: OPHTHALMOLOGY

## 2020-09-14 ASSESSMENT — REFRACTION_AUTOREFRACTION
OD_CYLINDER: -0.50
OD_AXIS: 140
OD_SPHERE: PLANO
OS_SPHERE: -0.50
OS_AXIS: 177
OS_CYLINDER: -0.25

## 2020-09-14 ASSESSMENT — KERATOMETRY
OD_K2POWER_DIOPTERS: 44.75
OD_AXISANGLE_DEGREES: 061
OS_AXISANGLE_DEGREES: 086
OD_K1POWER_DIOPTERS: 44.00
OS_K1POWER_DIOPTERS: 44.75
OS_K2POWER_DIOPTERS: 45.00

## 2020-09-14 ASSESSMENT — REFRACTION_CURRENTRX
OD_AXIS: 180
OS_CYLINDER: 0.00
OD_OVR_VA: 20/
OS_ADD: +2.00
OD_VPRISM_DIRECTION: PROGS
OD_SPHERE: -0.75
OS_OVR_VA: 20/
OS_SPHERE: -0.75
OS_AXIS: 180
OD_CYLINDER: 0.00
OS_VPRISM_DIRECTION: PROGS
OD_ADD: +2.00

## 2020-09-14 ASSESSMENT — CONFRONTATIONAL VISUAL FIELD TEST (CVF)
OS_FINDINGS: FULL
OD_FINDINGS: FULL

## 2020-09-14 ASSESSMENT — AXIALLENGTH_DERIVED: OS_AL: 23.3332

## 2020-09-14 ASSESSMENT — SPHEQUIV_DERIVED: OS_SPHEQUIV: -0.625

## 2020-09-14 ASSESSMENT — VISUAL ACUITY
OS_BCVA: 20/25
OD_BCVA: 20/30

## 2020-10-23 ENCOUNTER — OFFICE (OUTPATIENT)
Dept: URBAN - METROPOLITAN AREA CLINIC 105 | Facility: CLINIC | Age: 60
Setting detail: OPHTHALMOLOGY
End: 2020-10-23
Payer: COMMERCIAL

## 2020-10-23 DIAGNOSIS — E11.3313: ICD-10-CM

## 2020-10-23 DIAGNOSIS — E11.3311: ICD-10-CM

## 2020-10-23 PROCEDURE — 67028 INJECTION EYE DRUG: CPT | Performed by: OPHTHALMOLOGY

## 2020-10-23 PROCEDURE — 92250 FUNDUS PHOTOGRAPHY W/I&R: CPT | Performed by: OPHTHALMOLOGY

## 2020-10-23 ASSESSMENT — REFRACTION_CURRENTRX
OD_OVR_VA: 20/
OS_SPHERE: -0.75
OS_CYLINDER: 0.00
OD_CYLINDER: 0.00
OD_SPHERE: -0.75
OS_OVR_VA: 20/
OS_ADD: +2.00
OS_VPRISM_DIRECTION: PROGS
OD_VPRISM_DIRECTION: PROGS
OS_AXIS: 180
OD_AXIS: 180
OD_ADD: +2.00

## 2020-10-23 ASSESSMENT — KERATOMETRY
OS_K1POWER_DIOPTERS: 44.75
OS_AXISANGLE_DEGREES: 086
OD_AXISANGLE_DEGREES: 061
OD_K1POWER_DIOPTERS: 44.00
OS_K2POWER_DIOPTERS: 45.00
OD_K2POWER_DIOPTERS: 44.75

## 2020-10-23 ASSESSMENT — SPHEQUIV_DERIVED: OS_SPHEQUIV: -0.625

## 2020-10-23 ASSESSMENT — REFRACTION_AUTOREFRACTION
OS_SPHERE: -0.50
OS_CYLINDER: -0.25
OD_CYLINDER: -0.50
OD_AXIS: 140
OS_AXIS: 177
OD_SPHERE: PLANO

## 2020-10-23 ASSESSMENT — VISUAL ACUITY
OS_BCVA: 20/20-2
OD_BCVA: 20/25-

## 2020-10-23 ASSESSMENT — CONFRONTATIONAL VISUAL FIELD TEST (CVF)
OS_FINDINGS: FULL
OD_FINDINGS: FULL

## 2020-10-23 ASSESSMENT — AXIALLENGTH_DERIVED: OS_AL: 23.3332

## 2020-11-09 ENCOUNTER — OFFICE (OUTPATIENT)
Dept: URBAN - METROPOLITAN AREA CLINIC 105 | Facility: CLINIC | Age: 60
Setting detail: OPHTHALMOLOGY
End: 2020-11-09
Payer: COMMERCIAL

## 2020-11-09 DIAGNOSIS — E11.3311: ICD-10-CM

## 2020-11-09 PROCEDURE — 67210 TREATMENT OF RETINAL LESION: CPT | Performed by: OPHTHALMOLOGY

## 2020-11-09 ASSESSMENT — REFRACTION_AUTOREFRACTION
OS_CYLINDER: -0.25
OD_AXIS: 140
OS_AXIS: 177
OD_SPHERE: PLANO
OD_CYLINDER: -0.50
OS_SPHERE: -0.50

## 2020-11-09 ASSESSMENT — CONFRONTATIONAL VISUAL FIELD TEST (CVF)
OS_FINDINGS: FULL
OD_FINDINGS: FULL

## 2020-11-09 ASSESSMENT — REFRACTION_CURRENTRX
OS_OVR_VA: 20/
OS_SPHERE: -0.75
OD_CYLINDER: 0.00
OD_VPRISM_DIRECTION: PROGS
OS_CYLINDER: 0.00
OD_ADD: +2.00
OS_VPRISM_DIRECTION: PROGS
OS_ADD: +2.00
OS_AXIS: 180
OD_OVR_VA: 20/
OD_SPHERE: -0.75
OD_AXIS: 180

## 2020-11-09 ASSESSMENT — VISUAL ACUITY
OD_BCVA: 20/25
OS_BCVA: 20/20-2

## 2020-11-09 ASSESSMENT — SPHEQUIV_DERIVED: OS_SPHEQUIV: -0.625

## 2020-11-09 ASSESSMENT — KERATOMETRY
OD_AXISANGLE_DEGREES: 061
OD_K1POWER_DIOPTERS: 44.00
OS_K2POWER_DIOPTERS: 45.00
OS_K1POWER_DIOPTERS: 44.75
OD_K2POWER_DIOPTERS: 44.75
OS_AXISANGLE_DEGREES: 086

## 2020-11-09 ASSESSMENT — AXIALLENGTH_DERIVED: OS_AL: 23.3332

## 2020-11-23 ENCOUNTER — OFFICE (OUTPATIENT)
Dept: URBAN - METROPOLITAN AREA CLINIC 105 | Facility: CLINIC | Age: 60
Setting detail: OPHTHALMOLOGY
End: 2020-11-23
Payer: COMMERCIAL

## 2020-11-23 DIAGNOSIS — E11.3312: ICD-10-CM

## 2020-11-23 PROCEDURE — 67210 TREATMENT OF RETINAL LESION: CPT | Performed by: OPHTHALMOLOGY

## 2020-11-23 ASSESSMENT — AXIALLENGTH_DERIVED: OS_AL: 23.3332

## 2020-11-23 ASSESSMENT — KERATOMETRY
OD_K1POWER_DIOPTERS: 44.00
OD_K2POWER_DIOPTERS: 44.75
OS_AXISANGLE_DEGREES: 086
OD_AXISANGLE_DEGREES: 061
OS_K2POWER_DIOPTERS: 45.00
OS_K1POWER_DIOPTERS: 44.75

## 2020-11-23 ASSESSMENT — REFRACTION_AUTOREFRACTION
OS_SPHERE: -0.50
OD_SPHERE: PLANO
OD_CYLINDER: -0.50
OS_AXIS: 177
OS_CYLINDER: -0.25
OD_AXIS: 140

## 2020-11-23 ASSESSMENT — REFRACTION_CURRENTRX
OS_AXIS: 180
OD_SPHERE: -0.75
OD_ADD: +2.00
OS_SPHERE: -0.75
OD_CYLINDER: 0.00
OS_OVR_VA: 20/
OD_OVR_VA: 20/
OS_ADD: +2.00
OD_AXIS: 180
OD_VPRISM_DIRECTION: PROGS
OS_CYLINDER: 0.00
OS_VPRISM_DIRECTION: PROGS

## 2020-11-23 ASSESSMENT — VISUAL ACUITY
OS_BCVA: 20/25
OD_BCVA: 20/30

## 2020-11-23 ASSESSMENT — SPHEQUIV_DERIVED: OS_SPHEQUIV: -0.625

## 2020-11-23 ASSESSMENT — CONFRONTATIONAL VISUAL FIELD TEST (CVF)
OD_FINDINGS: FULL
OS_FINDINGS: FULL

## 2021-01-11 ENCOUNTER — OFFICE (OUTPATIENT)
Dept: URBAN - METROPOLITAN AREA CLINIC 105 | Facility: CLINIC | Age: 61
Setting detail: OPHTHALMOLOGY
End: 2021-01-11
Payer: COMMERCIAL

## 2021-01-11 DIAGNOSIS — E11.3312: ICD-10-CM

## 2021-01-11 DIAGNOSIS — E11.3313: ICD-10-CM

## 2021-01-11 PROCEDURE — 67028 INJECTION EYE DRUG: CPT | Performed by: OPHTHALMOLOGY

## 2021-01-11 PROCEDURE — 92134 CPTRZ OPH DX IMG PST SGM RTA: CPT | Performed by: OPHTHALMOLOGY

## 2021-01-11 ASSESSMENT — REFRACTION_CURRENTRX
OD_AXIS: 180
OS_SPHERE: -0.75
OD_CYLINDER: 0.00
OS_AXIS: 180
OD_OVR_VA: 20/
OS_OVR_VA: 20/
OD_SPHERE: -0.75
OS_ADD: +2.00
OD_VPRISM_DIRECTION: PROGS
OD_ADD: +2.00
OS_VPRISM_DIRECTION: PROGS
OS_CYLINDER: 0.00

## 2021-01-11 ASSESSMENT — KERATOMETRY
OS_K1POWER_DIOPTERS: 44.75
OS_AXISANGLE_DEGREES: 086
OD_K1POWER_DIOPTERS: 44.00
OD_K2POWER_DIOPTERS: 44.75
OS_K2POWER_DIOPTERS: 45.00
OD_AXISANGLE_DEGREES: 061

## 2021-01-11 ASSESSMENT — VISUAL ACUITY
OD_BCVA: 20/30
OS_BCVA: 20/25+2

## 2021-01-11 ASSESSMENT — REFRACTION_AUTOREFRACTION
OD_AXIS: 140
OS_CYLINDER: -0.25
OS_SPHERE: -0.50
OD_CYLINDER: -0.50
OD_SPHERE: PLANO
OS_AXIS: 177

## 2021-01-11 ASSESSMENT — CONFRONTATIONAL VISUAL FIELD TEST (CVF)
OS_FINDINGS: FULL
OD_FINDINGS: FULL

## 2021-01-11 ASSESSMENT — AXIALLENGTH_DERIVED: OS_AL: 23.3332

## 2021-01-11 ASSESSMENT — SPHEQUIV_DERIVED: OS_SPHEQUIV: -0.625

## 2021-02-22 ENCOUNTER — OFFICE (OUTPATIENT)
Dept: URBAN - METROPOLITAN AREA CLINIC 105 | Facility: CLINIC | Age: 61
Setting detail: OPHTHALMOLOGY
End: 2021-02-22
Payer: COMMERCIAL

## 2021-02-22 DIAGNOSIS — E11.3313: ICD-10-CM

## 2021-02-22 DIAGNOSIS — E11.3311: ICD-10-CM

## 2021-02-22 PROCEDURE — 67028 INJECTION EYE DRUG: CPT | Performed by: OPHTHALMOLOGY

## 2021-02-22 PROCEDURE — 92250 FUNDUS PHOTOGRAPHY W/I&R: CPT | Performed by: OPHTHALMOLOGY

## 2021-02-22 ASSESSMENT — REFRACTION_CURRENTRX
OS_OVR_VA: 20/
OD_ADD: +2.00
OS_CYLINDER: 0.00
OS_AXIS: 180
OS_SPHERE: -0.75
OD_CYLINDER: 0.00
OD_AXIS: 180
OS_VPRISM_DIRECTION: PROGS
OS_ADD: +2.00
OD_VPRISM_DIRECTION: PROGS
OD_OVR_VA: 20/
OD_SPHERE: -0.75

## 2021-02-22 ASSESSMENT — VISUAL ACUITY
OS_BCVA: 20/30-1
OD_BCVA: 20/30+2

## 2021-02-22 ASSESSMENT — AXIALLENGTH_DERIVED: OS_AL: 23.3332

## 2021-02-22 ASSESSMENT — REFRACTION_AUTOREFRACTION
OD_SPHERE: PLANO
OS_AXIS: 177
OS_SPHERE: -0.50
OD_CYLINDER: -0.50
OD_AXIS: 140
OS_CYLINDER: -0.25

## 2021-02-22 ASSESSMENT — KERATOMETRY
OS_AXISANGLE_DEGREES: 086
OD_AXISANGLE_DEGREES: 061
OD_K1POWER_DIOPTERS: 44.00
OD_K2POWER_DIOPTERS: 44.75
OS_K2POWER_DIOPTERS: 45.00
OS_K1POWER_DIOPTERS: 44.75

## 2021-02-22 ASSESSMENT — CONFRONTATIONAL VISUAL FIELD TEST (CVF)
OD_FINDINGS: FULL
OS_FINDINGS: FULL

## 2021-02-22 ASSESSMENT — TONOMETRY
OS_IOP_MMHG: 16
OD_IOP_MMHG: 16

## 2021-02-22 ASSESSMENT — SPHEQUIV_DERIVED: OS_SPHEQUIV: -0.625

## 2021-03-22 ENCOUNTER — OFFICE (OUTPATIENT)
Dept: URBAN - METROPOLITAN AREA CLINIC 105 | Facility: CLINIC | Age: 61
Setting detail: OPHTHALMOLOGY
End: 2021-03-22
Payer: COMMERCIAL

## 2021-03-22 DIAGNOSIS — E11.3311: ICD-10-CM

## 2021-03-22 PROCEDURE — 67210 TREATMENT OF RETINAL LESION: CPT | Performed by: OPHTHALMOLOGY

## 2021-03-22 ASSESSMENT — REFRACTION_CURRENTRX
OS_OVR_VA: 20/
OD_ADD: +2.00
OD_OVR_VA: 20/
OS_AXIS: 180
OD_CYLINDER: 0.00
OS_ADD: +2.00
OS_SPHERE: -0.75
OD_VPRISM_DIRECTION: PROGS
OS_VPRISM_DIRECTION: PROGS
OD_SPHERE: -0.75
OD_AXIS: 180
OS_CYLINDER: 0.00

## 2021-03-22 ASSESSMENT — VISUAL ACUITY
OS_BCVA: 20/25-1
OD_BCVA: 20/30+2

## 2021-03-22 ASSESSMENT — KERATOMETRY
OD_K1POWER_DIOPTERS: 44.00
OS_K1POWER_DIOPTERS: 44.75
OD_AXISANGLE_DEGREES: 061
OS_K2POWER_DIOPTERS: 45.00
OD_K2POWER_DIOPTERS: 44.75
OS_AXISANGLE_DEGREES: 086

## 2021-03-22 ASSESSMENT — CONFRONTATIONAL VISUAL FIELD TEST (CVF)
OD_FINDINGS: FULL
OS_FINDINGS: FULL

## 2021-03-22 ASSESSMENT — REFRACTION_AUTOREFRACTION
OS_AXIS: 177
OD_SPHERE: PLANO
OD_CYLINDER: -0.50
OS_CYLINDER: -0.25
OD_AXIS: 140
OS_SPHERE: -0.50

## 2021-03-22 ASSESSMENT — AXIALLENGTH_DERIVED: OS_AL: 23.3332

## 2021-03-22 ASSESSMENT — SPHEQUIV_DERIVED: OS_SPHEQUIV: -0.625

## 2021-04-12 ENCOUNTER — OFFICE (OUTPATIENT)
Dept: URBAN - METROPOLITAN AREA CLINIC 105 | Facility: CLINIC | Age: 61
Setting detail: OPHTHALMOLOGY
End: 2021-04-12
Payer: COMMERCIAL

## 2021-04-12 DIAGNOSIS — E11.3312: ICD-10-CM

## 2021-04-12 PROCEDURE — 67210 TREATMENT OF RETINAL LESION: CPT | Performed by: OPHTHALMOLOGY

## 2021-04-12 ASSESSMENT — REFRACTION_AUTOREFRACTION
OS_AXIS: 177
OD_SPHERE: PLANO
OS_CYLINDER: -0.25
OD_CYLINDER: -0.50
OS_SPHERE: -0.50
OD_AXIS: 140

## 2021-04-12 ASSESSMENT — REFRACTION_CURRENTRX
OS_CYLINDER: 0.00
OD_CYLINDER: 0.00
OD_SPHERE: -0.75
OS_SPHERE: -0.75
OS_ADD: +2.00
OS_AXIS: 180
OS_VPRISM_DIRECTION: PROGS
OS_OVR_VA: 20/
OD_VPRISM_DIRECTION: PROGS
OD_OVR_VA: 20/
OD_ADD: +2.00
OD_AXIS: 180

## 2021-04-12 ASSESSMENT — KERATOMETRY
OS_AXISANGLE_DEGREES: 086
OD_K2POWER_DIOPTERS: 44.75
OD_AXISANGLE_DEGREES: 061
OD_K1POWER_DIOPTERS: 44.00
OS_K1POWER_DIOPTERS: 44.75
OS_K2POWER_DIOPTERS: 45.00

## 2021-04-12 ASSESSMENT — VISUAL ACUITY
OS_BCVA: 20/25-1
OD_BCVA: 20/30-1

## 2021-04-12 ASSESSMENT — SPHEQUIV_DERIVED: OS_SPHEQUIV: -0.625

## 2021-04-12 ASSESSMENT — CONFRONTATIONAL VISUAL FIELD TEST (CVF)
OS_FINDINGS: FULL
OD_FINDINGS: FULL

## 2021-04-12 ASSESSMENT — AXIALLENGTH_DERIVED: OS_AL: 23.3332

## 2021-04-23 ENCOUNTER — OFFICE (OUTPATIENT)
Dept: URBAN - METROPOLITAN AREA CLINIC 105 | Facility: CLINIC | Age: 61
Setting detail: OPHTHALMOLOGY
End: 2021-04-23
Payer: COMMERCIAL

## 2021-04-23 DIAGNOSIS — E11.3313: ICD-10-CM

## 2021-04-23 DIAGNOSIS — E11.3312: ICD-10-CM

## 2021-04-23 PROCEDURE — 67028 INJECTION EYE DRUG: CPT | Performed by: OPHTHALMOLOGY

## 2021-04-23 PROCEDURE — 92134 CPTRZ OPH DX IMG PST SGM RTA: CPT | Performed by: OPHTHALMOLOGY

## 2021-04-23 ASSESSMENT — CONFRONTATIONAL VISUAL FIELD TEST (CVF)
OS_FINDINGS: FULL
OD_FINDINGS: FULL

## 2021-04-23 ASSESSMENT — REFRACTION_CURRENTRX
OS_VPRISM_DIRECTION: PROGS
OD_OVR_VA: 20/
OS_ADD: +2.00
OS_SPHERE: -0.75
OD_CYLINDER: 0.00
OD_SPHERE: -0.75
OD_AXIS: 180
OS_AXIS: 180
OD_ADD: +2.00
OS_OVR_VA: 20/
OD_VPRISM_DIRECTION: PROGS
OS_CYLINDER: 0.00

## 2021-04-23 ASSESSMENT — REFRACTION_AUTOREFRACTION
OD_CYLINDER: -0.50
OS_AXIS: 177
OS_SPHERE: -0.50
OS_CYLINDER: -0.25
OD_AXIS: 140
OD_SPHERE: PLANO

## 2021-04-23 ASSESSMENT — SPHEQUIV_DERIVED: OS_SPHEQUIV: -0.625

## 2021-04-23 ASSESSMENT — KERATOMETRY
OD_K2POWER_DIOPTERS: 44.75
OS_K1POWER_DIOPTERS: 44.75
OS_AXISANGLE_DEGREES: 086
OS_K2POWER_DIOPTERS: 45.00
OD_K1POWER_DIOPTERS: 44.00
OD_AXISANGLE_DEGREES: 061

## 2021-04-23 ASSESSMENT — VISUAL ACUITY
OS_BCVA: 20/25
OD_BCVA: 20/40+2

## 2021-04-23 ASSESSMENT — AXIALLENGTH_DERIVED: OS_AL: 23.3332

## 2021-06-14 ENCOUNTER — OFFICE (OUTPATIENT)
Dept: URBAN - METROPOLITAN AREA CLINIC 105 | Facility: CLINIC | Age: 61
Setting detail: OPHTHALMOLOGY
End: 2021-06-14
Payer: COMMERCIAL

## 2021-06-14 DIAGNOSIS — E11.3311: ICD-10-CM

## 2021-06-14 DIAGNOSIS — E11.3313: ICD-10-CM

## 2021-06-14 PROCEDURE — 67028 INJECTION EYE DRUG: CPT | Performed by: OPHTHALMOLOGY

## 2021-06-14 PROCEDURE — 92250 FUNDUS PHOTOGRAPHY W/I&R: CPT | Performed by: OPHTHALMOLOGY

## 2021-06-14 ASSESSMENT — REFRACTION_AUTOREFRACTION
OD_CYLINDER: -0.50
OS_AXIS: 177
OS_SPHERE: -0.50
OS_CYLINDER: -0.25
OD_AXIS: 140
OD_SPHERE: PLANO

## 2021-06-14 ASSESSMENT — REFRACTION_CURRENTRX
OS_SPHERE: -0.75
OS_OVR_VA: 20/
OS_ADD: +2.00
OD_SPHERE: -0.75
OD_VPRISM_DIRECTION: PROGS
OS_VPRISM_DIRECTION: PROGS
OD_OVR_VA: 20/
OS_CYLINDER: 0.00
OD_AXIS: 180
OS_AXIS: 180
OD_CYLINDER: 0.00
OD_ADD: +2.00

## 2021-06-14 ASSESSMENT — CONFRONTATIONAL VISUAL FIELD TEST (CVF)
OD_FINDINGS: FULL
OS_FINDINGS: FULL

## 2021-06-14 ASSESSMENT — KERATOMETRY
OS_AXISANGLE_DEGREES: 086
OD_AXISANGLE_DEGREES: 061
OD_K2POWER_DIOPTERS: 44.75
OS_K2POWER_DIOPTERS: 45.00
OD_K1POWER_DIOPTERS: 44.00
OS_K1POWER_DIOPTERS: 44.75

## 2021-06-14 ASSESSMENT — VISUAL ACUITY
OD_BCVA: 20/30-
OS_BCVA: 20/25+2

## 2021-06-14 ASSESSMENT — AXIALLENGTH_DERIVED: OS_AL: 23.3332

## 2021-06-14 ASSESSMENT — SPHEQUIV_DERIVED: OS_SPHEQUIV: -0.625

## 2021-06-28 ENCOUNTER — OFFICE (OUTPATIENT)
Dept: URBAN - METROPOLITAN AREA CLINIC 105 | Facility: CLINIC | Age: 61
Setting detail: OPHTHALMOLOGY
End: 2021-06-28
Payer: COMMERCIAL

## 2021-06-28 DIAGNOSIS — E11.3313: ICD-10-CM

## 2021-06-28 DIAGNOSIS — E11.3311: ICD-10-CM

## 2021-06-28 PROCEDURE — 67210 TREATMENT OF RETINAL LESION: CPT | Performed by: OPHTHALMOLOGY

## 2021-06-28 PROCEDURE — 92250 FUNDUS PHOTOGRAPHY W/I&R: CPT | Performed by: OPHTHALMOLOGY

## 2021-06-28 ASSESSMENT — VISUAL ACUITY
OS_BCVA: 20/25-1
OD_BCVA: 20/30-

## 2021-06-28 ASSESSMENT — SPHEQUIV_DERIVED: OS_SPHEQUIV: -0.625

## 2021-06-28 ASSESSMENT — REFRACTION_AUTOREFRACTION
OS_SPHERE: -0.50
OS_CYLINDER: -0.25
OD_AXIS: 140
OD_SPHERE: PLANO
OS_AXIS: 177
OD_CYLINDER: -0.50

## 2021-06-28 ASSESSMENT — KERATOMETRY
OS_K1POWER_DIOPTERS: 44.75
OD_K1POWER_DIOPTERS: 44.00
OS_K2POWER_DIOPTERS: 45.00
OS_AXISANGLE_DEGREES: 086
OD_K2POWER_DIOPTERS: 44.75
OD_AXISANGLE_DEGREES: 061

## 2021-06-28 ASSESSMENT — REFRACTION_CURRENTRX
OS_OVR_VA: 20/
OS_VPRISM_DIRECTION: PROGS
OS_AXIS: 180
OD_ADD: +2.00
OD_VPRISM_DIRECTION: PROGS
OS_ADD: +2.00
OD_AXIS: 180
OD_CYLINDER: 0.00
OS_CYLINDER: 0.00
OD_SPHERE: -0.75
OD_OVR_VA: 20/
OS_SPHERE: -0.75

## 2021-06-28 ASSESSMENT — AXIALLENGTH_DERIVED: OS_AL: 23.3332

## 2021-06-28 ASSESSMENT — CONFRONTATIONAL VISUAL FIELD TEST (CVF)
OD_FINDINGS: FULL
OS_FINDINGS: FULL

## 2021-07-26 ENCOUNTER — OFFICE (OUTPATIENT)
Dept: URBAN - METROPOLITAN AREA CLINIC 105 | Facility: CLINIC | Age: 61
Setting detail: OPHTHALMOLOGY
End: 2021-07-26
Payer: COMMERCIAL

## 2021-07-26 DIAGNOSIS — E11.3313: ICD-10-CM

## 2021-07-26 DIAGNOSIS — E11.3312: ICD-10-CM

## 2021-07-26 PROCEDURE — 92250 FUNDUS PHOTOGRAPHY W/I&R: CPT | Performed by: OPHTHALMOLOGY

## 2021-07-26 PROCEDURE — 67210 TREATMENT OF RETINAL LESION: CPT | Performed by: OPHTHALMOLOGY

## 2021-07-26 ASSESSMENT — REFRACTION_AUTOREFRACTION
OS_CYLINDER: -0.25
OD_SPHERE: PLANO
OS_AXIS: 177
OD_CYLINDER: -0.50
OS_SPHERE: -0.50
OD_AXIS: 140

## 2021-07-26 ASSESSMENT — REFRACTION_CURRENTRX
OD_ADD: +2.00
OD_SPHERE: -0.75
OD_CYLINDER: 0.00
OS_OVR_VA: 20/
OD_AXIS: 180
OD_OVR_VA: 20/
OS_VPRISM_DIRECTION: PROGS
OS_SPHERE: -0.75
OS_CYLINDER: 0.00
OD_VPRISM_DIRECTION: PROGS
OS_ADD: +2.00
OS_AXIS: 180

## 2021-07-26 ASSESSMENT — AXIALLENGTH_DERIVED: OS_AL: 23.3332

## 2021-07-26 ASSESSMENT — KERATOMETRY
OD_K2POWER_DIOPTERS: 44.75
OD_AXISANGLE_DEGREES: 061
OS_AXISANGLE_DEGREES: 086
OS_K1POWER_DIOPTERS: 44.75
OS_K2POWER_DIOPTERS: 45.00
OD_K1POWER_DIOPTERS: 44.00

## 2021-07-26 ASSESSMENT — VISUAL ACUITY
OS_BCVA: 20/25-1
OD_BCVA: 20/30-

## 2021-07-26 ASSESSMENT — SPHEQUIV_DERIVED: OS_SPHEQUIV: -0.625

## 2021-07-26 ASSESSMENT — CONFRONTATIONAL VISUAL FIELD TEST (CVF)
OS_FINDINGS: FULL
OD_FINDINGS: FULL

## 2021-09-24 ENCOUNTER — OFFICE (OUTPATIENT)
Dept: URBAN - METROPOLITAN AREA CLINIC 105 | Facility: CLINIC | Age: 61
Setting detail: OPHTHALMOLOGY
End: 2021-09-24
Payer: COMMERCIAL

## 2021-09-24 DIAGNOSIS — E11.3313: ICD-10-CM

## 2021-09-24 DIAGNOSIS — E11.3311: ICD-10-CM

## 2021-09-24 PROCEDURE — 67028 INJECTION EYE DRUG: CPT | Performed by: OPHTHALMOLOGY

## 2021-09-24 PROCEDURE — 92134 CPTRZ OPH DX IMG PST SGM RTA: CPT | Performed by: OPHTHALMOLOGY

## 2021-09-24 ASSESSMENT — REFRACTION_CURRENTRX
OD_OVR_VA: 20/
OD_ADD: +2.00
OD_VPRISM_DIRECTION: PROGS
OD_AXIS: 180
OS_ADD: +2.00
OD_CYLINDER: 0.00
OS_AXIS: 180
OD_SPHERE: -0.75
OS_OVR_VA: 20/
OS_CYLINDER: 0.00
OS_VPRISM_DIRECTION: PROGS
OS_SPHERE: -0.75

## 2021-09-24 ASSESSMENT — KERATOMETRY
OS_K2POWER_DIOPTERS: 45.00
OS_K1POWER_DIOPTERS: 44.75
OS_AXISANGLE_DEGREES: 086
OD_AXISANGLE_DEGREES: 061
OD_K1POWER_DIOPTERS: 44.00
OD_K2POWER_DIOPTERS: 44.75

## 2021-09-24 ASSESSMENT — CONFRONTATIONAL VISUAL FIELD TEST (CVF)
OS_FINDINGS: FULL
OD_FINDINGS: FULL

## 2021-09-24 ASSESSMENT — REFRACTION_AUTOREFRACTION
OS_CYLINDER: -0.25
OS_SPHERE: -0.50
OD_CYLINDER: -0.50
OD_SPHERE: PLANO
OD_AXIS: 140
OS_AXIS: 177

## 2021-09-24 ASSESSMENT — AXIALLENGTH_DERIVED: OS_AL: 23.3332

## 2021-09-24 ASSESSMENT — VISUAL ACUITY
OS_BCVA: 20/40
OD_BCVA: 20/40-2

## 2021-09-24 ASSESSMENT — SPHEQUIV_DERIVED: OS_SPHEQUIV: -0.625

## 2021-10-20 ENCOUNTER — OFFICE (OUTPATIENT)
Dept: URBAN - METROPOLITAN AREA CLINIC 105 | Facility: CLINIC | Age: 61
Setting detail: OPHTHALMOLOGY
End: 2021-10-20
Payer: COMMERCIAL

## 2021-10-20 DIAGNOSIS — E11.3311: ICD-10-CM

## 2021-10-20 DIAGNOSIS — E11.3313: ICD-10-CM

## 2021-10-20 PROCEDURE — 92250 FUNDUS PHOTOGRAPHY W/I&R: CPT | Performed by: OPHTHALMOLOGY

## 2021-10-20 PROCEDURE — 67210 TREATMENT OF RETINAL LESION: CPT | Performed by: OPHTHALMOLOGY

## 2021-10-20 ASSESSMENT — REFRACTION_CURRENTRX
OD_VPRISM_DIRECTION: PROGS
OS_VPRISM_DIRECTION: PROGS
OS_AXIS: 180
OS_ADD: +2.00
OD_SPHERE: -0.75
OD_CYLINDER: 0.00
OS_OVR_VA: 20/
OS_CYLINDER: 0.00
OD_AXIS: 180
OD_ADD: +2.00
OD_OVR_VA: 20/
OS_SPHERE: -0.75

## 2021-10-20 ASSESSMENT — REFRACTION_AUTOREFRACTION
OS_SPHERE: -0.50
OS_CYLINDER: -0.25
OD_CYLINDER: -0.50
OS_AXIS: 177
OD_AXIS: 140
OD_SPHERE: PLANO

## 2021-10-20 ASSESSMENT — SPHEQUIV_DERIVED: OS_SPHEQUIV: -0.625

## 2021-10-20 ASSESSMENT — CONFRONTATIONAL VISUAL FIELD TEST (CVF)
OD_FINDINGS: FULL
OS_FINDINGS: FULL

## 2021-10-20 ASSESSMENT — AXIALLENGTH_DERIVED: OS_AL: 23.3332

## 2021-10-20 ASSESSMENT — VISUAL ACUITY
OD_BCVA: 20/40-2
OS_BCVA: 20/30

## 2021-11-08 ENCOUNTER — OFFICE (OUTPATIENT)
Dept: URBAN - METROPOLITAN AREA CLINIC 105 | Facility: CLINIC | Age: 61
Setting detail: OPHTHALMOLOGY
End: 2021-11-08
Payer: COMMERCIAL

## 2021-11-08 DIAGNOSIS — E11.3312: ICD-10-CM

## 2021-11-08 DIAGNOSIS — E11.3313: ICD-10-CM

## 2021-11-08 PROCEDURE — 92250 FUNDUS PHOTOGRAPHY W/I&R: CPT | Performed by: OPHTHALMOLOGY

## 2021-11-08 PROCEDURE — 67210 TREATMENT OF RETINAL LESION: CPT | Performed by: OPHTHALMOLOGY

## 2021-11-08 ASSESSMENT — KERATOMETRY
OS_K2POWER_DIOPTERS: 45.00
OD_K2POWER_DIOPTERS: 44.75
OS_AXISANGLE_DEGREES: 086
OD_AXISANGLE_DEGREES: 061
OS_K1POWER_DIOPTERS: 44.75
OD_K1POWER_DIOPTERS: 44.00

## 2021-11-08 ASSESSMENT — SPHEQUIV_DERIVED: OS_SPHEQUIV: -0.625

## 2021-11-08 ASSESSMENT — REFRACTION_AUTOREFRACTION
OS_AXIS: 177
OS_SPHERE: -0.50
OS_CYLINDER: -0.25
OD_SPHERE: PLANO
OD_AXIS: 140
OD_CYLINDER: -0.50

## 2021-11-08 ASSESSMENT — CONFRONTATIONAL VISUAL FIELD TEST (CVF)
OD_FINDINGS: FULL
OS_FINDINGS: FULL

## 2021-11-08 ASSESSMENT — VISUAL ACUITY
OS_BCVA: 20/25-
OD_BCVA: 20/30+2

## 2021-11-08 ASSESSMENT — AXIALLENGTH_DERIVED: OS_AL: 23.3332

## 2021-11-22 ENCOUNTER — OFFICE (OUTPATIENT)
Dept: URBAN - METROPOLITAN AREA CLINIC 59 | Facility: CLINIC | Age: 61
Setting detail: OPHTHALMOLOGY
End: 2021-11-22
Payer: COMMERCIAL

## 2021-11-22 DIAGNOSIS — E11.3312: ICD-10-CM

## 2021-11-22 DIAGNOSIS — E11.3313: ICD-10-CM

## 2021-11-22 PROCEDURE — 67028 INJECTION EYE DRUG: CPT | Performed by: OPHTHALMOLOGY

## 2021-11-22 PROCEDURE — 92134 CPTRZ OPH DX IMG PST SGM RTA: CPT | Performed by: OPHTHALMOLOGY

## 2021-11-22 ASSESSMENT — KERATOMETRY
OD_K2POWER_DIOPTERS: 44.75
OS_AXISANGLE_DEGREES: 086
OD_K1POWER_DIOPTERS: 44.00
OD_AXISANGLE_DEGREES: 061
OS_K1POWER_DIOPTERS: 44.75
OS_K2POWER_DIOPTERS: 45.00

## 2021-11-22 ASSESSMENT — REFRACTION_AUTOREFRACTION
OD_SPHERE: PLANO
OD_CYLINDER: -0.50
OS_SPHERE: -0.50
OS_CYLINDER: -0.25
OS_AXIS: 177
OD_AXIS: 140

## 2021-11-22 ASSESSMENT — VISUAL ACUITY
OS_BCVA: 20/25
OD_BCVA: 20/40

## 2021-11-22 ASSESSMENT — CONFRONTATIONAL VISUAL FIELD TEST (CVF)
OD_FINDINGS: FULL
OS_FINDINGS: FULL

## 2021-11-22 ASSESSMENT — SPHEQUIV_DERIVED: OS_SPHEQUIV: -0.625

## 2021-11-22 ASSESSMENT — AXIALLENGTH_DERIVED: OS_AL: 23.3332

## 2021-12-13 ENCOUNTER — OFFICE (OUTPATIENT)
Dept: URBAN - METROPOLITAN AREA CLINIC 105 | Facility: CLINIC | Age: 61
Setting detail: OPHTHALMOLOGY
End: 2021-12-13
Payer: COMMERCIAL

## 2021-12-13 DIAGNOSIS — E11.3312: ICD-10-CM

## 2021-12-13 DIAGNOSIS — E11.3313: ICD-10-CM

## 2021-12-13 PROCEDURE — 92250 FUNDUS PHOTOGRAPHY W/I&R: CPT | Performed by: OPHTHALMOLOGY

## 2021-12-13 PROCEDURE — 67028 INJECTION EYE DRUG: CPT | Performed by: OPHTHALMOLOGY

## 2021-12-13 ASSESSMENT — VISUAL ACUITY
OD_BCVA: 20/25-1
OS_BCVA: 20/25-2

## 2021-12-13 ASSESSMENT — REFRACTION_AUTOREFRACTION
OD_AXIS: 140
OD_SPHERE: PLANO
OD_CYLINDER: -0.50
OS_SPHERE: -0.50
OS_CYLINDER: -0.25
OS_AXIS: 177

## 2021-12-13 ASSESSMENT — SPHEQUIV_DERIVED: OS_SPHEQUIV: -0.625

## 2021-12-13 ASSESSMENT — KERATOMETRY
OS_AXISANGLE_DEGREES: 086
OD_K1POWER_DIOPTERS: 44.00
OD_AXISANGLE_DEGREES: 061
OS_K2POWER_DIOPTERS: 45.00
OD_K2POWER_DIOPTERS: 44.75
OS_K1POWER_DIOPTERS: 44.75

## 2021-12-13 ASSESSMENT — CONFRONTATIONAL VISUAL FIELD TEST (CVF)
OS_FINDINGS: FULL
OD_FINDINGS: FULL

## 2021-12-13 ASSESSMENT — AXIALLENGTH_DERIVED: OS_AL: 23.3332

## 2022-04-11 ENCOUNTER — OFFICE (OUTPATIENT)
Dept: URBAN - METROPOLITAN AREA CLINIC 105 | Facility: CLINIC | Age: 62
Setting detail: OPHTHALMOLOGY
End: 2022-04-11
Payer: MEDICARE

## 2022-04-11 DIAGNOSIS — E11.3311: ICD-10-CM

## 2022-04-11 DIAGNOSIS — E11.3313: ICD-10-CM

## 2022-04-11 DIAGNOSIS — H35.033: ICD-10-CM

## 2022-04-11 PROCEDURE — 67210 TREATMENT OF RETINAL LESION: CPT | Performed by: OPHTHALMOLOGY

## 2022-04-11 PROCEDURE — 92134 CPTRZ OPH DX IMG PST SGM RTA: CPT | Performed by: OPHTHALMOLOGY

## 2022-04-11 ASSESSMENT — REFRACTION_AUTOREFRACTION
OS_CYLINDER: -0.25
OD_CYLINDER: -0.50
OS_SPHERE: -0.50
OD_SPHERE: PLANO
OD_AXIS: 140
OS_AXIS: 177

## 2022-04-11 ASSESSMENT — KERATOMETRY
OD_K1POWER_DIOPTERS: 44.00
OS_AXISANGLE_DEGREES: 086
OD_K2POWER_DIOPTERS: 44.75
OD_AXISANGLE_DEGREES: 061
OS_K2POWER_DIOPTERS: 45.00
OS_K1POWER_DIOPTERS: 44.75

## 2022-04-11 ASSESSMENT — AXIALLENGTH_DERIVED: OS_AL: 23.3332

## 2022-04-11 ASSESSMENT — SPHEQUIV_DERIVED: OS_SPHEQUIV: -0.625

## 2022-04-11 ASSESSMENT — VISUAL ACUITY
OD_BCVA: 20/30
OS_BCVA: 20/25-1

## 2022-04-11 ASSESSMENT — CONFRONTATIONAL VISUAL FIELD TEST (CVF)
OD_FINDINGS: FULL
OS_FINDINGS: FULL

## 2022-05-23 ENCOUNTER — OFFICE (OUTPATIENT)
Dept: URBAN - METROPOLITAN AREA CLINIC 105 | Facility: CLINIC | Age: 62
Setting detail: OPHTHALMOLOGY
End: 2022-05-23
Payer: MEDICARE

## 2022-05-23 DIAGNOSIS — H35.033: ICD-10-CM

## 2022-05-23 DIAGNOSIS — E11.3313: ICD-10-CM

## 2022-05-23 DIAGNOSIS — E11.3312: ICD-10-CM

## 2022-05-23 PROBLEM — E11.3311 DM TYPE 2; RIGHT MOD WITH ME, LEFT MOD WITH ME: Status: ACTIVE | Noted: 2018-12-10

## 2022-05-23 PROCEDURE — 67210 TREATMENT OF RETINAL LESION: CPT | Performed by: OPHTHALMOLOGY

## 2022-05-23 PROCEDURE — 92134 CPTRZ OPH DX IMG PST SGM RTA: CPT | Performed by: OPHTHALMOLOGY

## 2022-05-23 ASSESSMENT — REFRACTION_AUTOREFRACTION
OD_CYLINDER: -0.50
OS_AXIS: 177
OD_AXIS: 140
OS_CYLINDER: -0.25
OS_SPHERE: -0.50
OD_SPHERE: PLANO

## 2022-05-23 ASSESSMENT — SPHEQUIV_DERIVED: OS_SPHEQUIV: -0.625

## 2022-05-23 ASSESSMENT — KERATOMETRY
OS_K1POWER_DIOPTERS: 44.75
OS_AXISANGLE_DEGREES: 086
OS_K2POWER_DIOPTERS: 45.00
OD_AXISANGLE_DEGREES: 061
OD_K2POWER_DIOPTERS: 44.75
OD_K1POWER_DIOPTERS: 44.00

## 2022-05-23 ASSESSMENT — CONFRONTATIONAL VISUAL FIELD TEST (CVF)
OS_FINDINGS: FULL
OD_FINDINGS: FULL

## 2022-05-23 ASSESSMENT — VISUAL ACUITY
OS_BCVA: 20/20-1
OD_BCVA: 20/30

## 2022-05-23 ASSESSMENT — AXIALLENGTH_DERIVED: OS_AL: 23.3332

## 2022-06-27 ENCOUNTER — OFFICE (OUTPATIENT)
Dept: URBAN - METROPOLITAN AREA CLINIC 105 | Facility: CLINIC | Age: 62
Setting detail: OPHTHALMOLOGY
End: 2022-06-27
Payer: MEDICARE

## 2022-06-27 DIAGNOSIS — H35.033: ICD-10-CM

## 2022-06-27 DIAGNOSIS — E11.3313: ICD-10-CM

## 2022-06-27 DIAGNOSIS — E11.3311: ICD-10-CM

## 2022-06-27 PROCEDURE — 67028 INJECTION EYE DRUG: CPT | Performed by: OPHTHALMOLOGY

## 2022-06-27 PROCEDURE — 92250 FUNDUS PHOTOGRAPHY W/I&R: CPT | Performed by: OPHTHALMOLOGY

## 2022-06-27 ASSESSMENT — REFRACTION_AUTOREFRACTION
OD_SPHERE: PLANO
OS_SPHERE: -0.50
OD_AXIS: 140
OS_AXIS: 177
OS_CYLINDER: -0.25
OD_CYLINDER: -0.50

## 2022-06-27 ASSESSMENT — KERATOMETRY
OD_AXISANGLE_DEGREES: 061
OD_K1POWER_DIOPTERS: 44.00
OD_K2POWER_DIOPTERS: 44.75
OS_K2POWER_DIOPTERS: 45.00
OS_K1POWER_DIOPTERS: 44.75
OS_AXISANGLE_DEGREES: 086

## 2022-06-27 ASSESSMENT — CONFRONTATIONAL VISUAL FIELD TEST (CVF)
OD_FINDINGS: FULL
OS_FINDINGS: FULL

## 2022-06-27 ASSESSMENT — VISUAL ACUITY
OD_BCVA: 20/30-1
OS_BCVA: 20/30

## 2022-06-27 ASSESSMENT — AXIALLENGTH_DERIVED: OS_AL: 23.3332

## 2022-06-27 ASSESSMENT — SPHEQUIV_DERIVED: OS_SPHEQUIV: -0.625

## 2022-08-17 ENCOUNTER — OFFICE (OUTPATIENT)
Dept: URBAN - METROPOLITAN AREA CLINIC 105 | Facility: CLINIC | Age: 62
Setting detail: OPHTHALMOLOGY
End: 2022-08-17
Payer: MEDICARE

## 2022-08-17 DIAGNOSIS — E11.3313: ICD-10-CM

## 2022-08-17 DIAGNOSIS — H35.033: ICD-10-CM

## 2022-08-17 DIAGNOSIS — E11.3311: ICD-10-CM

## 2022-08-17 PROCEDURE — 92134 CPTRZ OPH DX IMG PST SGM RTA: CPT | Performed by: OPHTHALMOLOGY

## 2022-08-17 PROCEDURE — 67210 TREATMENT OF RETINAL LESION: CPT | Performed by: OPHTHALMOLOGY

## 2022-08-17 ASSESSMENT — CONFRONTATIONAL VISUAL FIELD TEST (CVF)
OD_FINDINGS: FULL
OS_FINDINGS: FULL

## 2022-08-17 ASSESSMENT — REFRACTION_AUTOREFRACTION
OS_AXIS: 177
OS_SPHERE: -0.50
OS_CYLINDER: -0.25
OD_AXIS: 140
OD_CYLINDER: -0.50
OD_SPHERE: PLANO

## 2022-08-17 ASSESSMENT — KERATOMETRY
OD_K1POWER_DIOPTERS: 44.00
OS_AXISANGLE_DEGREES: 086
OD_K2POWER_DIOPTERS: 44.75
OS_K2POWER_DIOPTERS: 45.00
OD_AXISANGLE_DEGREES: 061
OS_K1POWER_DIOPTERS: 44.75

## 2022-08-17 ASSESSMENT — TONOMETRY
OS_IOP_MMHG: 17
OD_IOP_MMHG: 16

## 2022-08-17 ASSESSMENT — VISUAL ACUITY
OS_BCVA: 20/25-1
OD_BCVA: 20/40-1

## 2022-08-17 ASSESSMENT — AXIALLENGTH_DERIVED: OS_AL: 23.3332

## 2022-08-17 ASSESSMENT — SPHEQUIV_DERIVED: OS_SPHEQUIV: -0.625

## 2022-09-21 ENCOUNTER — OFFICE (OUTPATIENT)
Dept: URBAN - METROPOLITAN AREA CLINIC 105 | Facility: CLINIC | Age: 62
Setting detail: OPHTHALMOLOGY
End: 2022-09-21
Payer: MEDICARE

## 2022-09-21 DIAGNOSIS — E11.3312: ICD-10-CM

## 2022-09-21 PROCEDURE — 67210 TREATMENT OF RETINAL LESION: CPT | Performed by: OPHTHALMOLOGY

## 2022-09-21 ASSESSMENT — VISUAL ACUITY
OD_BCVA: 20/40-1
OS_BCVA: 20/40-2

## 2022-09-21 ASSESSMENT — REFRACTION_AUTOREFRACTION
OS_AXIS: 177
OS_CYLINDER: -0.25
OS_SPHERE: -0.50
OD_SPHERE: PLANO
OD_CYLINDER: -0.50
OD_AXIS: 140

## 2022-09-21 ASSESSMENT — KERATOMETRY
OD_K1POWER_DIOPTERS: 44.00
OD_AXISANGLE_DEGREES: 061
OS_K1POWER_DIOPTERS: 44.75
OS_AXISANGLE_DEGREES: 086
OD_K2POWER_DIOPTERS: 44.75
OS_K2POWER_DIOPTERS: 45.00

## 2022-09-21 ASSESSMENT — SPHEQUIV_DERIVED: OS_SPHEQUIV: -0.625

## 2022-09-21 ASSESSMENT — CONFRONTATIONAL VISUAL FIELD TEST (CVF)
OS_FINDINGS: FULL
OD_FINDINGS: FULL

## 2022-09-21 ASSESSMENT — AXIALLENGTH_DERIVED: OS_AL: 23.3332

## 2022-09-23 ENCOUNTER — OFFICE (OUTPATIENT)
Dept: URBAN - METROPOLITAN AREA CLINIC 105 | Facility: CLINIC | Age: 62
Setting detail: OPHTHALMOLOGY
End: 2022-09-23
Payer: MEDICARE

## 2022-09-23 DIAGNOSIS — E11.3311: ICD-10-CM

## 2022-09-23 DIAGNOSIS — H35.033: ICD-10-CM

## 2022-09-23 DIAGNOSIS — E11.3312: ICD-10-CM

## 2022-09-23 DIAGNOSIS — E11.3313: ICD-10-CM

## 2022-09-23 PROCEDURE — 67028 INJECTION EYE DRUG: CPT | Performed by: OPHTHALMOLOGY

## 2022-09-23 PROCEDURE — 92250 FUNDUS PHOTOGRAPHY W/I&R: CPT | Performed by: OPHTHALMOLOGY

## 2022-09-23 ASSESSMENT — SPHEQUIV_DERIVED: OS_SPHEQUIV: -0.625

## 2022-09-23 ASSESSMENT — CONFRONTATIONAL VISUAL FIELD TEST (CVF)
OS_FINDINGS: FULL
OD_FINDINGS: FULL

## 2022-09-23 ASSESSMENT — REFRACTION_AUTOREFRACTION
OS_SPHERE: -0.50
OD_SPHERE: PLANO
OD_AXIS: 140
OS_CYLINDER: -0.25
OD_CYLINDER: -0.50
OS_AXIS: 177

## 2022-09-23 ASSESSMENT — VISUAL ACUITY
OS_BCVA: 20/30-2
OD_BCVA: 20/40+1

## 2022-09-23 ASSESSMENT — KERATOMETRY
OS_AXISANGLE_DEGREES: 086
OS_K1POWER_DIOPTERS: 44.75
OD_K1POWER_DIOPTERS: 44.00
OD_AXISANGLE_DEGREES: 061
OD_K2POWER_DIOPTERS: 44.75
OS_K2POWER_DIOPTERS: 45.00

## 2022-09-23 ASSESSMENT — AXIALLENGTH_DERIVED: OS_AL: 23.3332

## 2022-10-19 ENCOUNTER — OFFICE (OUTPATIENT)
Dept: URBAN - METROPOLITAN AREA CLINIC 105 | Facility: CLINIC | Age: 62
Setting detail: OPHTHALMOLOGY
End: 2022-10-19
Payer: MEDICARE

## 2022-10-19 DIAGNOSIS — H35.033: ICD-10-CM

## 2022-10-19 DIAGNOSIS — E11.3313: ICD-10-CM

## 2022-10-19 DIAGNOSIS — E11.3312: ICD-10-CM

## 2022-10-19 PROCEDURE — 92134 CPTRZ OPH DX IMG PST SGM RTA: CPT | Performed by: OPHTHALMOLOGY

## 2022-10-19 PROCEDURE — 67028 INJECTION EYE DRUG: CPT | Performed by: OPHTHALMOLOGY

## 2022-10-19 ASSESSMENT — KERATOMETRY
OS_K1POWER_DIOPTERS: 44.75
OD_AXISANGLE_DEGREES: 061
OD_K1POWER_DIOPTERS: 44.00
OS_AXISANGLE_DEGREES: 086
OD_K2POWER_DIOPTERS: 44.75
OS_K2POWER_DIOPTERS: 45.00

## 2022-10-19 ASSESSMENT — REFRACTION_AUTOREFRACTION
OS_SPHERE: -0.50
OD_AXIS: 140
OS_CYLINDER: -0.25
OD_CYLINDER: -0.50
OD_SPHERE: PLANO
OS_AXIS: 177

## 2022-10-19 ASSESSMENT — VISUAL ACUITY
OD_BCVA: 20/40
OS_BCVA: 20/30

## 2022-10-19 ASSESSMENT — CONFRONTATIONAL VISUAL FIELD TEST (CVF)
OD_FINDINGS: FULL
OS_FINDINGS: FULL

## 2022-10-19 ASSESSMENT — SPHEQUIV_DERIVED: OS_SPHEQUIV: -0.625

## 2022-10-19 ASSESSMENT — AXIALLENGTH_DERIVED: OS_AL: 23.3332

## 2023-06-19 ENCOUNTER — OFFICE VISIT (OUTPATIENT)
Dept: BEHAVIORAL HEALTH | Facility: PSYCHIATRIC FACILITY | Age: 63
End: 2023-06-19
Payer: COMMERCIAL

## 2023-06-19 DIAGNOSIS — F41.8 OTHER SPECIFIED ANXIETY DISORDERS: ICD-10-CM

## 2023-06-19 PROCEDURE — 90837 PSYTX W PT 60 MINUTES: CPT | Performed by: PSYCHOLOGIST

## 2023-06-22 NOTE — PROGRESS NOTES
[facility]  Psychotherapy Summary Note    Full therapy note has been documented and is under restricted viewing.  Please see below for summary of today's session.     Patient Name: Barrett Brush  Patient MRN: [id number]  Today's Date:  6/19/2023    Type of session: intake assessment  Session start time: 10:00 am  Session stop time: 11:00 am  Length of time spent face to face with patient: 60 minutes  Persons in attendance: patient    Diagnoses: Anxiety Disorder NOS    Symptoms currently being addressed in therapy: anxiety    Therapeutic Intervention(s): CBT    Treatment Goal(s)/Objective(s) addressed: To address anxiety via use of CBT.    Progress toward Treatment Goals: N/A - This was an intake assessment    Plan:      - Continue weekly therapy.    Anayansi Lombardero, Ph.D.       Message   Recorded as Task   Date: 08/14/2017 03:08 PM, Created By: Mercedes Meléndez   Task Name: Medical Complaint Callback   Assigned To: [facility] triage,Team   Regarding Patient: India Nevarez, Status: In Progress   Ke Hannah - 14 Aug 2017 3:08 PM     TASK CREATED  Caller: Obinna Beverly, Mother; Medical Complaint; [phone]  REQUESTING HOSPITAL FOLLOW UP APPOINTMENT RE: Jason Hall - 14 Aug 2017 3:08 PM     TASK IN PROGRESS   Irene Funes - 14 Aug 2017 3:18 PM     TASK EDITED  has   problems  with  eczema  ,  ovedue  for  well  check  apt   made  for  815  at  240pm        Active Problems   1  Asthma (493 90) (J45 909)  2  Eczema (692 9) (L30 9)  3  Need for influenza vaccination (V04 81) (Z23)    Current Meds  1  HydrOXYzine HCl - 25 MG Oral Tablet; TAKE 1 TABLET 3 TIMES DAILY AS NEEDED; Therapy: [id number] to (Evaluate:18Apr2017)  Requested for: [id number]; Last   Rx:[id number] Ordered    Allergies   1  Penicillins  2   Benadryl CAPS    Signatures   Electronically signed by : Karolyn Wharton, ; Aug 14 2017  3:19PM EST                       (Author)    Electronically signed by : Joseph Cotton DO; Aug 14 2017  4:01PM EST                       (Acknowledgement)
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
## 2023-07-10 ENCOUNTER — APPOINTMENT (OUTPATIENT)
Dept: BEHAVIORAL HEALTH | Facility: PSYCHIATRIC FACILITY | Age: 63
End: 2023-07-10
Payer: COMMERCIAL

## 2023-07-11 ENCOUNTER — OFFICE VISIT (OUTPATIENT)
Dept: MEDICAL GROUP | Facility: PHYSICIAN GROUP | Age: 63
End: 2023-07-11
Payer: COMMERCIAL

## 2023-07-11 VITALS
WEIGHT: 167 LBS | HEART RATE: 86 BPM | BODY MASS INDEX: 29.59 KG/M2 | TEMPERATURE: 97.7 F | SYSTOLIC BLOOD PRESSURE: 168 MMHG | OXYGEN SATURATION: 96 % | RESPIRATION RATE: 16 BRPM | HEIGHT: 63 IN | DIASTOLIC BLOOD PRESSURE: 70 MMHG

## 2023-07-11 DIAGNOSIS — Z00.00 PHYSICAL EXAM, ANNUAL: ICD-10-CM

## 2023-07-11 DIAGNOSIS — I10 PRIMARY HYPERTENSION: ICD-10-CM

## 2023-07-11 DIAGNOSIS — R35.1 BENIGN PROSTATIC HYPERPLASIA WITH NOCTURIA: ICD-10-CM

## 2023-07-11 DIAGNOSIS — N40.1 BENIGN PROSTATIC HYPERPLASIA WITH NOCTURIA: ICD-10-CM

## 2023-07-11 DIAGNOSIS — E11.9 TYPE 2 DIABETES MELLITUS WITHOUT COMPLICATION, WITHOUT LONG-TERM CURRENT USE OF INSULIN (HCC): ICD-10-CM

## 2023-07-11 DIAGNOSIS — Z11.59 ENCOUNTER FOR HEPATITIS C SCREENING TEST FOR LOW RISK PATIENT: ICD-10-CM

## 2023-07-11 PROCEDURE — 3077F SYST BP >= 140 MM HG: CPT | Performed by: PHYSICIAN ASSISTANT

## 2023-07-11 PROCEDURE — 3078F DIAST BP <80 MM HG: CPT | Performed by: PHYSICIAN ASSISTANT

## 2023-07-11 PROCEDURE — 99204 OFFICE O/P NEW MOD 45 MIN: CPT | Performed by: PHYSICIAN ASSISTANT

## 2023-07-11 RX ORDER — OLMESARTAN MEDOXOMIL 20 MG/1
40 TABLET ORAL DAILY
COMMUNITY
Start: 2023-06-05 | End: 2023-07-11

## 2023-07-11 RX ORDER — HYDROCHLOROTHIAZIDE 25 MG/1
25 TABLET ORAL
COMMUNITY
Start: 2023-05-01 | End: 2023-07-11

## 2023-07-11 RX ORDER — ACETAMINOPHEN 160 MG
2 TABLET,DISINTEGRATING ORAL
COMMUNITY
Start: 2023-05-01 | End: 2024-01-03

## 2023-07-11 RX ORDER — AMLODIPINE BESYLATE 5 MG/1
TABLET ORAL DAILY
COMMUNITY
End: 2023-07-11

## 2023-07-11 RX ORDER — CARVEDILOL 6.25 MG/1
6.25 TABLET ORAL 2 TIMES DAILY
COMMUNITY
Start: 2023-06-05 | End: 2023-07-11

## 2023-07-11 RX ORDER — LISINOPRIL 40 MG/1
40 TABLET ORAL
COMMUNITY
Start: 2023-05-01 | End: 2023-07-11

## 2023-07-11 RX ORDER — LOSARTAN POTASSIUM AND HYDROCHLOROTHIAZIDE 12.5; 5 MG/1; MG/1
1 TABLET ORAL
COMMUNITY
Start: 2023-06-26 | End: 2023-07-11

## 2023-07-11 RX ORDER — OLMESARTAN MEDOXOMIL 40 MG/1
40 TABLET ORAL
COMMUNITY
Start: 2023-05-23 | End: 2023-07-11

## 2023-07-11 RX ORDER — AMLODIPINE BESYLATE 10 MG/1
10 TABLET ORAL DAILY
Qty: 90 TABLET | Refills: 0 | Status: SHIPPED | OUTPATIENT
Start: 2023-07-11 | End: 2024-01-03

## 2023-07-11 RX ORDER — AMLODIPINE BESYLATE 5 MG/1
5 TABLET ORAL
COMMUNITY
Start: 2023-05-23 | End: 2023-07-11

## 2023-07-11 RX ORDER — DOXEPIN HYDROCHLORIDE 25 MG/1
CAPSULE ORAL
COMMUNITY
Start: 2023-05-23 | End: 2024-01-03

## 2023-07-11 ASSESSMENT — PATIENT HEALTH QUESTIONNAIRE - PHQ9: CLINICAL INTERPRETATION OF PHQ2 SCORE: 0

## 2023-07-11 ASSESSMENT — FIBROSIS 4 INDEX: FIB4 SCORE: 1.1

## 2023-07-11 NOTE — PROGRESS NOTES
CC:    Chief Complaint   Patient presents with    [facility]     BP, used to take amlodopine and losartan- hctz but he didn't like the way he was feeling        HISTORY OF THE PRESENT ILLNESS: Patient is a 63 y.o. male presenting to [facility]     Pt has elevated BP. HCTZ was triggering him to have low Na and he was hospitalized for 4 days for this about 6 weeks.   Pt found to be diabetic while hospitalized recently with A1C of 6.6%.  Patient having nocturia as well as weak urinary stream.    No problem-specific Assessment & Plan notes found for this encounter.    Allergies: Patient has no allergy information on record.    Current Outpatient Medications Ordered in Epic   Medication Sig Dispense Refill    amLODIPine (NORVASC) 5 MG Tab Take 5 mg by mouth every day. (Patient not taking: Reported on 7/11/2023)      amLODIPine (NORVASC) 5 MG Tab Take  by mouth every day. (Patient not taking: Reported on 7/11/2023)      carvedilol (COREG) 6.25 MG Tab Take 6.25 mg by mouth 2 times a day. (Patient not taking: Reported on 7/11/2023)      Cholecalciferol (VITAMIN D3) 2000 UNIT Cap Take 2 Capsules by mouth every day. (Patient not taking: Reported on 7/11/2023)      doxepin (SINEQUAN) 25 MG Cap TAKE 1 CAPSULE BY MOUTH EVERY DAY AT BEDTIME FOR SLEEP (Patient not taking: Reported on 7/11/2023)      hydroCHLOROthiazide (HYDRODIURIL) 25 MG Tab Take 25 mg by mouth every day. (Patient not taking: Reported on 7/11/2023)      lisinopril (PRINIVIL) 40 MG tablet Take 40 mg by mouth every day. (Patient not taking: Reported on 7/11/2023)      losartan-hydrochlorothiazide (HYZAAR) 50-12.5 MG per tablet Take 1 Tablet by mouth every day. (Patient not taking: Reported on 7/11/2023)      olmesartan (BENICAR) 20 MG Tab Take 40 mg by mouth every day. (Patient not taking: Reported on 7/11/2023)      olmesartan (BENICAR) 40 MG Tab Take 40 mg by mouth every day. (Patient not taking: Reported on 7/11/2023)       No current  "Epic-ordered facility-administered medications on file.       No past medical history on file.    No past surgical history on file.    Social History     Tobacco Use    Smoking status: Never    Smokeless tobacco: Never   Vaping Use    Vaping Use: Never used   Substance Use Topics    Alcohol use: Not Currently    Drug use: Never       Social History     Social History Narrative    Not on file       No family history on file.    ROS:     - Constitutional: Negative for fever, chills, unexpected weight change, and fatigue/generalized weakness.     - HEENT: Negative for headaches, vision changes, hearing changes, ear pain, ear discharge, rhinorrhea, sinus congestion, sore throat, and neck pain.      - Respiratory: Negative for cough, sputum production, chest congestion, dyspnea, wheezing, and crackles.      - Cardiovascular: Negative for chest pain, palpitations, orthopnea, and bilateral lower extremity edema.     - Gastrointestinal: Negative for heartburn, nausea, vomiting, abdominal pain, hematochezia, melena, diarrhea, constipation, and greasy/foul-smelling stools.     - Genitourinary: Positive for nocturia.  Negative for dysuria, polyuria, hematuria, pyuria, urinary urgency, and urinary incontinence.         .      Exam: BP (!) 168/70   Pulse 86   Temp 36.5 °C (97.7 °F) (Temporal)   Resp 16   Ht 1.6 m (5' 2.99\")   Wt 75.8 kg (167 lb)   SpO2 96%  Body mass index is 29.59 kg/m².    General: Normal appearing. No acute distress.  Skin: Warm and dry.  No obvious lesions.  HEENT: Normocephalic. Eyes conjunctiva clear lids without ptosis, ears normal shape and contour  Cardiovascular: Regular rate and rhythm without murmur.   Respiratory: Clear to auscultation bilaterally, no rhonchi wheezing or rales.  Neurologic: Grossly nonfocal, A&O x3, gait normal,  Musculoskeletal: No deformity or swelling.   Extremities: No extremity cyanosis, clubbing, or edema.  Psych: Normal mood and affect. Judgment and insight is " normal.    Please note that this dictation was created using voice recognition software. I have made every reasonable attempt to correct obvious errors, but I expect that there are errors of grammar and possibly content that I did not discover before finalizing the note.      Assessment/Plan  1. Physical exam, annual  Labs printed  - CBC WITH DIFFERENTIAL; Future  - Comp Metabolic Panel; Future  - HEMOGLOBIN A1C; Future    2. Type 2 diabetes mellitus without complication, without long-term current use of insulin (HCC)  This is a new diagnosis.  We will recheck A1c and review at next visit  - MICROALBUMIN CREAT RATIO URINE; Future    3. Encounter for hepatitis C screening test for low risk patient    - HEP C VIRUS ANTIBODY; Future    4. Primary hypertension  Restart on norvasc and recheck BP in 3 weeks. Track at home.   - amLODIPine (NORVASC) 10 MG Tab; Take 1 Tablet by mouth every day.  Dispense: 90 Tablet; Refill: 0    5. Benign prostatic hyperplasia with nocturia  Will check PSA and discuss meds at next visit  - PROSTATE SPECIFIC AG DIAGNOSTIC; Future

## 2023-08-08 ENCOUNTER — OFFICE VISIT (OUTPATIENT)
Dept: MEDICAL GROUP | Facility: PHYSICIAN GROUP | Age: 63
End: 2023-08-08
Payer: COMMERCIAL

## 2023-08-08 VITALS
HEART RATE: 84 BPM | OXYGEN SATURATION: 96 % | HEIGHT: 62 IN | RESPIRATION RATE: 16 BRPM | SYSTOLIC BLOOD PRESSURE: 140 MMHG | TEMPERATURE: 97.7 F | WEIGHT: 169 LBS | DIASTOLIC BLOOD PRESSURE: 64 MMHG | BODY MASS INDEX: 31.1 KG/M2

## 2023-08-08 DIAGNOSIS — R80.9 TYPE 2 DIABETES MELLITUS WITH MICROALBUMINURIA, WITHOUT LONG-TERM CURRENT USE OF INSULIN (HCC): ICD-10-CM

## 2023-08-08 DIAGNOSIS — E11.29 TYPE 2 DIABETES MELLITUS WITH MICROALBUMINURIA, WITHOUT LONG-TERM CURRENT USE OF INSULIN (HCC): ICD-10-CM

## 2023-08-08 DIAGNOSIS — N40.1 BENIGN PROSTATIC HYPERPLASIA WITH NOCTURIA: ICD-10-CM

## 2023-08-08 DIAGNOSIS — I10 PRIMARY HYPERTENSION: ICD-10-CM

## 2023-08-08 DIAGNOSIS — R35.1 BENIGN PROSTATIC HYPERPLASIA WITH NOCTURIA: ICD-10-CM

## 2023-08-08 DIAGNOSIS — Z00.00 PHYSICAL EXAM, ANNUAL: ICD-10-CM

## 2023-08-08 DIAGNOSIS — Z12.11 SCREENING FOR COLON CANCER: ICD-10-CM

## 2023-08-08 PROCEDURE — 99396 PREV VISIT EST AGE 40-64: CPT | Performed by: PHYSICIAN ASSISTANT

## 2023-08-08 PROCEDURE — 3078F DIAST BP <80 MM HG: CPT | Performed by: PHYSICIAN ASSISTANT

## 2023-08-08 PROCEDURE — 3077F SYST BP >= 140 MM HG: CPT | Performed by: PHYSICIAN ASSISTANT

## 2023-08-08 RX ORDER — TAMSULOSIN HYDROCHLORIDE 0.4 MG/1
0.4 CAPSULE ORAL
Qty: 90 CAPSULE | Refills: 0 | Status: SHIPPED | OUTPATIENT
Start: 2023-08-08 | End: 2023-11-08 | Stop reason: SDUPTHER

## 2023-08-08 RX ORDER — LISINOPRIL 10 MG/1
10 TABLET ORAL DAILY
Qty: 90 TABLET | Refills: 0 | Status: SHIPPED | OUTPATIENT
Start: 2023-08-08 | End: 2023-11-08

## 2023-08-08 ASSESSMENT — FIBROSIS 4 INDEX: FIB4 SCORE: 0.97

## 2023-08-08 NOTE — PROGRESS NOTES
CC:    Chief Complaint   Patient presents with    Annual Exam     Lab results        HISTORY OF THE PRESENT ILLNESS:  63 y.o. male presenting for annual physical exam.   Pt's A1C at 6.4%.  He is not currently on any diabetes medications.  Patient is open to starting on a prostate medication. PSA at 1.66.  Patient has been taking his blood pressure at home and has been getting elevated readings.      No problem-specific Assessment & Plan notes found for this encounter.    Allergies: Hydrochlorothiazide    Current Outpatient Medications Ordered in Epic   Medication Sig Dispense Refill    amLODIPine (NORVASC) 10 MG Tab Take 1 Tablet by mouth every day. 90 Tablet 0    Cholecalciferol (VITAMIN D3) 2000 UNIT Cap Take 2 Capsules by mouth every day. (Patient not taking: Reported on 7/11/2023)      doxepin (SINEQUAN) 25 MG Cap TAKE 1 CAPSULE BY MOUTH EVERY DAY AT BEDTIME FOR SLEEP (Patient not taking: Reported on 7/11/2023)       No current [facility]-ordered facility-administered medications on file.       No past medical history on file.    No past surgical history on file.    Social History     Tobacco Use    Smoking status: Never    Smokeless tobacco: Never   Vaping Use    Vaping Use: Never used   Substance Use Topics    Alcohol use: Not Currently    Drug use: Never       Social History     Social History Narrative    Not on file       No family history on file.    ROS:     - Constitutional: Negative for fever, chills, unexpected weight change, and fatigue/generalized weakness.     - HEENT: Negative for headaches, vision changes, hearing changes, ear pain, ear discharge, rhinorrhea, sinus congestion, sore throat, and neck pain.      - Respiratory: Negative for cough, sputum production, chest congestion, dyspnea, wheezing, and crackles.      - Cardiovascular: Negative for chest pain, palpitations, orthopnea, and bilateral lower extremity edema.          Health Maintenance  Cholesterol Screening: Fasting lipid panel  Diabetes  "Screening: Fasting blood sugar  Exercise: Regular exercise.   Substance Abuse: None  Safe in relationship Yes     Cancer screening  Colorectal Cancer Screenin years ago. Cologuard ordered today         Exam: BP (!) 140/64   Pulse 84   Temp 36.5 °C (97.7 °F) (Temporal)   Resp 16   Ht 1.575 m (5' 2\")   Wt 76.7 kg (169 lb)   SpO2 96%  Body mass index is 30.91 kg/m².    General: Normal appearing. No distress.  HEENT: Normocephalic. Eyes conjunctiva clear lids without ptosis, pupils equal and reactive to light accommodation, ears normal shape and contour, canals are clear bilaterally, tympanic membranes are benign, nasal mucosa benign, oropharynx is without erythema, edema or exudates.   Neck: Supple without JVD or bruit. No thyromegaly. No cervical lymphadenopathy  Pulmonary: Clear to ausculation.  Normal effort. No rales, ronchi, or wheezing.  Cardiovascular: Regular rate and rhythm without murmur, gallops or rubs  Neurologic: Grossly nonfocal, gait normal, normal muscle tone  Skin: Warm and dry.  No obvious lesions.  Musculoskeletal: No extremity cyanosis, clubbing, or edema. No deformity  Psych: Normal mood and affect. Alert and oriented x3. Judgment and insight is normal.    Please note that this dictation was created using voice recognition software. I have made every reasonable attempt to correct obvious errors, but I expect that there are errors of grammar and possibly content that I did not discover before finalizing the note.    Monofilament testing with a 10 gram force: sensation intact: intact bilaterally  Visual Inspection: Feet without maceration, ulcers, fissures.  Pedal pulses: intact bilaterally      Assessment/Plan  1. Physical exam, annual  PE conducted.    2. Primary hypertension  Start on lisinopril 10mg  - lisinopril (PRINIVIL) 10 MG Tab; Take 1 Tablet by mouth every day.  Dispense: 90 Tablet; Refill: 0    3. Type 2 diabetes mellitus with microalbuminuria, without long-term current use of " insulin (HCC)  New finding of microalbuminuria. Start on ACE-I. Control with diet.  - Diabetic Monofilament LE Exam    4. Benign prostatic hyperplasia with nocturia  Start on flomax. Recheck in three months  - tamsulosin (FLOMAX) 0.4 MG capsule; Take 1 Capsule by mouth 1/2 hour after breakfast.  Dispense: 90 Capsule; Refill: 0    5. Screening for colon cancer    - COLARJUN (FIT DNA)

## 2023-11-08 ENCOUNTER — OFFICE VISIT (OUTPATIENT)
Dept: MEDICAL GROUP | Facility: PHYSICIAN GROUP | Age: 63
End: 2023-11-08
Payer: COMMERCIAL

## 2023-11-08 VITALS
BODY MASS INDEX: 30.91 KG/M2 | RESPIRATION RATE: 14 BRPM | TEMPERATURE: 96.5 F | WEIGHT: 168 LBS | HEIGHT: 62 IN | OXYGEN SATURATION: 97 % | DIASTOLIC BLOOD PRESSURE: 74 MMHG | SYSTOLIC BLOOD PRESSURE: 148 MMHG | HEART RATE: 70 BPM

## 2023-11-08 DIAGNOSIS — E78.2 MIXED HYPERLIPIDEMIA: ICD-10-CM

## 2023-11-08 DIAGNOSIS — E11.29 TYPE 2 DIABETES MELLITUS WITH MICROALBUMINURIA, WITHOUT LONG-TERM CURRENT USE OF INSULIN (HCC): ICD-10-CM

## 2023-11-08 DIAGNOSIS — I10 PRIMARY HYPERTENSION: ICD-10-CM

## 2023-11-08 DIAGNOSIS — N40.1 BENIGN PROSTATIC HYPERPLASIA WITH NOCTURIA: ICD-10-CM

## 2023-11-08 DIAGNOSIS — R80.9 TYPE 2 DIABETES MELLITUS WITH MICROALBUMINURIA, WITHOUT LONG-TERM CURRENT USE OF INSULIN (HCC): ICD-10-CM

## 2023-11-08 DIAGNOSIS — R35.1 BENIGN PROSTATIC HYPERPLASIA WITH NOCTURIA: ICD-10-CM

## 2023-11-08 DIAGNOSIS — R44.0 AUDITORY HALLUCINATIONS: ICD-10-CM

## 2023-11-08 PROCEDURE — 99214 OFFICE O/P EST MOD 30 MIN: CPT | Performed by: PHYSICIAN ASSISTANT

## 2023-11-08 PROCEDURE — 3077F SYST BP >= 140 MM HG: CPT | Performed by: PHYSICIAN ASSISTANT

## 2023-11-08 PROCEDURE — 3078F DIAST BP <80 MM HG: CPT | Performed by: PHYSICIAN ASSISTANT

## 2023-11-08 RX ORDER — LISINOPRIL 30 MG/1
30 TABLET ORAL DAILY
Qty: 90 TABLET | Refills: 0 | Status: SHIPPED | OUTPATIENT
Start: 2023-11-08

## 2023-11-08 RX ORDER — TAMSULOSIN HYDROCHLORIDE 0.4 MG/1
0.4 CAPSULE ORAL
Qty: 90 CAPSULE | Refills: 3 | Status: SHIPPED | OUTPATIENT
Start: 2023-11-08

## 2023-11-08 RX ORDER — ATORVASTATIN CALCIUM 20 MG/1
20 TABLET, FILM COATED ORAL DAILY
Qty: 90 TABLET | Refills: 3 | Status: SHIPPED | OUTPATIENT
Start: 2023-11-08

## 2023-11-08 ASSESSMENT — FIBROSIS 4 INDEX: FIB4 SCORE: 0.97

## 2023-11-08 NOTE — PROGRESS NOTES
"CC:  Chief Complaint   Patient presents with    Follow-Up       HISTORY OF PRESENT ILLNESS: Patient is a 63 y.o. male established patient presenting with issues below  At last visit started on flomax and states that he is finding it helpful.   Started on lisinopril 10mg at last visit.   Requesting to have information regarding diabetic dietary guidelines.   Daughter notes that her father has been having auditory hallucinations in last several months.     Current Outpatient Medications   Medication Sig Dispense Refill    lisinopril (PRINIVIL) 10 MG Tab Take 1 Tablet by mouth every day. 90 Tablet 0    tamsulosin (FLOMAX) 0.4 MG capsule Take 1 Capsule by mouth 1/2 hour after breakfast. 90 Capsule 0    Cholecalciferol (VITAMIN D3) 2000 UNIT Cap Take 2 Capsules by mouth every day. (Patient not taking: Reported on 7/11/2023)      doxepin (SINEQUAN) 25 MG Cap TAKE 1 CAPSULE BY MOUTH EVERY DAY AT BEDTIME FOR SLEEP (Patient not taking: Reported on 7/11/2023)      amLODIPine (NORVASC) 10 MG Tab Take 1 Tablet by mouth every day. (Patient not taking: Reported on 11/8/2023) 90 Tablet 0     No current facility-administered medications for this visit.        ROS:     ROS    Exam:    BP (!) 148/74 (BP Location: Left arm, Patient Position: Sitting, BP Cuff Size: Adult)   Pulse 70   Temp 35.8 °C (96.5 °F) (Temporal)   Resp 14   Ht 1.575 m (5' 2\")   Wt 76.2 kg (168 lb)   SpO2 97%  Body mass index is 30.73 kg/m².    General:  Well nourished, well developed male in NAD  Head is grossly normal.  Neck: Supple.   Pulmonary: Clear to auscultation. No ronchi, wheezing or rales  Cardiac: Regular rate and rhythm. No murmurs.  Skin: Warm and dry. No obvious lesions  Neuro: Normal muscle tone. Gait normal. Alert and oriented.  Psych: Normal mood and affect      Please note that this dictation was created using voice recognition software. I have made every reasonable attempt to correct obvious errors, but I expect that there are errors of " grammar and possibly content that I did not discover before finalizing the note.        Assessment/Plan:  1. Type 2 diabetes mellitus with microalbuminuria, without long-term current use of insulin (HCC)  We will set up with diabetic education program.  - Referral to Diabetic Education  - POCT Retinal Eye Exam    2. Auditory hallucinations  We will get evaluation by psychiatry  - Referral to Psychiatry    3. Mixed hyperlipidemia  Start on Lipitor 20 mg.  - atorvastatin (LIPITOR) 20 MG Tab; Take 1 Tablet by mouth every day.  Dispense: 90 Tablet; Refill: 3    4. Primary hypertension  Increase lisinopril dose to 30 mg.  Recheck in 1 month  - lisinopril (PRINIVIL) 30 MG tablet; Take 1 Tablet by mouth every day.  Dispense: 90 Tablet; Refill: 0    5. Benign prostatic hyperplasia with nocturia  Much improved.  Continue Flomax 0.4 mg.  - tamsulosin (FLOMAX) 0.4 MG capsule; Take 1 Capsule by mouth 1/2 hour after breakfast.  Dispense: 90 Capsule; Refill: 3

## 2024-01-03 ENCOUNTER — OFFICE VISIT (OUTPATIENT)
Dept: MEDICAL GROUP | Facility: PHYSICIAN GROUP | Age: 64
End: 2024-01-03
Payer: COMMERCIAL

## 2024-01-03 VITALS
SYSTOLIC BLOOD PRESSURE: 164 MMHG | OXYGEN SATURATION: 97 % | HEIGHT: 62 IN | DIASTOLIC BLOOD PRESSURE: 78 MMHG | TEMPERATURE: 99.2 F | RESPIRATION RATE: 12 BRPM | BODY MASS INDEX: 31.47 KG/M2 | HEART RATE: 61 BPM | WEIGHT: 171 LBS

## 2024-01-03 DIAGNOSIS — Z23 NEED FOR VACCINATION: ICD-10-CM

## 2024-01-03 DIAGNOSIS — R80.9 TYPE 2 DIABETES MELLITUS WITH MICROALBUMINURIA, WITHOUT LONG-TERM CURRENT USE OF INSULIN (HCC): ICD-10-CM

## 2024-01-03 DIAGNOSIS — E11.29 TYPE 2 DIABETES MELLITUS WITH MICROALBUMINURIA, WITHOUT LONG-TERM CURRENT USE OF INSULIN (HCC): ICD-10-CM

## 2024-01-03 DIAGNOSIS — I10 PRIMARY HYPERTENSION: ICD-10-CM

## 2024-01-03 LAB
HBA1C MFR BLD: 6 % (ref ?–5.8)
POCT INT CON NEG: NEGATIVE
POCT INT CON POS: POSITIVE

## 2024-01-03 PROCEDURE — 90471 IMMUNIZATION ADMIN: CPT | Performed by: PHYSICIAN ASSISTANT

## 2024-01-03 PROCEDURE — 3077F SYST BP >= 140 MM HG: CPT | Performed by: PHYSICIAN ASSISTANT

## 2024-01-03 PROCEDURE — 99214 OFFICE O/P EST MOD 30 MIN: CPT | Mod: 25 | Performed by: PHYSICIAN ASSISTANT

## 2024-01-03 PROCEDURE — 90686 IIV4 VACC NO PRSV 0.5 ML IM: CPT | Performed by: PHYSICIAN ASSISTANT

## 2024-01-03 PROCEDURE — 3078F DIAST BP <80 MM HG: CPT | Performed by: PHYSICIAN ASSISTANT

## 2024-01-03 PROCEDURE — 83036 HEMOGLOBIN GLYCOSYLATED A1C: CPT | Performed by: PHYSICIAN ASSISTANT

## 2024-01-03 RX ORDER — AMLODIPINE BESYLATE 10 MG/1
10 TABLET ORAL DAILY
Qty: 90 TABLET | Refills: 1 | Status: SHIPPED | OUTPATIENT
Start: 2024-01-03

## 2024-01-03 ASSESSMENT — PATIENT HEALTH QUESTIONNAIRE - PHQ9: CLINICAL INTERPRETATION OF PHQ2 SCORE: 0

## 2024-01-03 ASSESSMENT — FIBROSIS 4 INDEX: FIB4 SCORE: 0.98

## 2024-01-03 NOTE — PROGRESS NOTES
"CC:  Chief Complaint   Patient presents with    Hypertension Follow-up       HISTORY OF PRESENT ILLNESS: Patient is a 64 y.o. male established patient presenting with issues below  Patient's hemoglobin A1c at 6%.  This is currently diet controlled.  Patient's blood pressure has been trending high.  His home blood pressure readings have been in the 160s to 170s systolic.  He is currently on lisinopril 30 mg.  Was previously on Norvasc 10 mg but stopped taking it.    Current Outpatient Medications   Medication Sig Dispense Refill    amLODIPine (NORVASC) 10 MG Tab Take 1 Tablet by mouth every day. 90 Tablet 1    atorvastatin (LIPITOR) 20 MG Tab Take 1 Tablet by mouth every day. 90 Tablet 3    lisinopril (PRINIVIL) 30 MG tablet Take 1 Tablet by mouth every day. 90 Tablet 0    tamsulosin (FLOMAX) 0.4 MG capsule Take 1 Capsule by mouth 1/2 hour after breakfast. 90 Capsule 3     No current facility-administered medications for this visit.        ROS:     ROS    Exam:    BP (!) 164/78 (BP Location: Left arm, Patient Position: Sitting, BP Cuff Size: Adult)   Pulse 61   Temp 37.3 °C (99.2 °F) (Temporal)   Resp 12   Ht 1.575 m (5' 2\")   Wt 77.6 kg (171 lb)   SpO2 97%  Body mass index is 31.28 kg/m².    General:  Well nourished, well developed male in NAD  Head is grossly normal.  Neck: Supple.   Pulmonary: Clear to auscultation. No ronchi, wheezing or rales  Cardiac: Regular rate and rhythm. No murmurs.  Skin: Warm and dry. No obvious lesions  Neuro: Normal muscle tone. Gait normal. Alert and oriented.  Psych: Normal mood and affect      Please note that this dictation was created using voice recognition software. I have made every reasonable attempt to correct obvious errors, but I expect that there are errors of grammar and possibly content that I did not discover before finalizing the note.        Assessment/Plan:  1. Type 2 diabetes mellitus with microalbuminuria, without long-term current use of insulin " (HCC)  Substantially improving with dietary control.  Continue to monitor every 6 months  - POCT Hemoglobin A1C    2. Need for vaccination    - INFLUENZA VACCINE QUAD INJ (PF)    3. Primary hypertension  Restart on Norvasc 10 mg and continue lisinopril 30 mg.  - amLODIPine (NORVASC) 10 MG Tab; Take 1 Tablet by mouth every day.  Dispense: 90 Tablet; Refill: 1